# Patient Record
Sex: MALE | Race: WHITE | Employment: OTHER | URBAN - METROPOLITAN AREA
[De-identification: names, ages, dates, MRNs, and addresses within clinical notes are randomized per-mention and may not be internally consistent; named-entity substitution may affect disease eponyms.]

---

## 2017-05-01 ENCOUNTER — OFFICE VISIT (OUTPATIENT)
Dept: NEUROLOGY | Age: 82
End: 2017-05-01

## 2017-05-01 VITALS
HEART RATE: 66 BPM | DIASTOLIC BLOOD PRESSURE: 88 MMHG | WEIGHT: 151 LBS | OXYGEN SATURATION: 96 % | SYSTOLIC BLOOD PRESSURE: 124 MMHG | HEIGHT: 65 IN | RESPIRATION RATE: 15 BRPM | BODY MASS INDEX: 25.16 KG/M2 | TEMPERATURE: 98.1 F

## 2017-05-01 DIAGNOSIS — G30.1 LATE ONSET ALZHEIMER'S DISEASE WITHOUT BEHAVIORAL DISTURBANCE (HCC): Primary | ICD-10-CM

## 2017-05-01 DIAGNOSIS — F02.80 LATE ONSET ALZHEIMER'S DISEASE WITHOUT BEHAVIORAL DISTURBANCE (HCC): Primary | ICD-10-CM

## 2017-05-01 DIAGNOSIS — Z71.89 GOALS OF CARE, COUNSELING/DISCUSSION: ICD-10-CM

## 2017-05-01 RX ORDER — CARVEDILOL 12.5 MG/1
12.5 TABLET ORAL 2 TIMES DAILY
Refills: 2 | Status: ON HOLD | COMMUNITY
Start: 2017-04-19 | End: 2017-07-26

## 2017-05-01 NOTE — PROGRESS NOTES
New patient presenting with h/o falls. Spouse and daughter in law reports increase in falls for past 2 months. Patient suddenly collapses. Was unable to get himself up. Patient current in [address] [place] [address]  and PT.

## 2017-05-01 NOTE — PROGRESS NOTES
5 [facility] [address]   [place]  [facility] [address] [facility], [facility] [address]    GYN   [phone] Fax             Referring: Dr Edgard Aragon      Chief Complaint   Patient presents with   Noah Hallmark Fall     new patient     44-year-old gentleman who presents today accompanied by his wife as well as his daughter-in-law for evaluation of progressive memory decline and also gait dysfunction. I reviewed the office notations that were kindly provided by Dr. Edgard Aragon. He is unable to provide any meaningful history. He will look to his wife and his family for history. As I understand it this gentleman has had a progressive decline in his cognitive function that his wife relates to occurring after his aortic aneurysm repair back in . Over the last 6 months things have taken a more progressive decline. He has been having difficulty with swallowing as well as ambulation. They have physical therapy coming into the home as well as speech therapy. They seen Dr. Edgard Aragon. He has been tried on Aricept which caused him diarrhea. He has stage III-stage IV kidney disease and will be following with the nephrologist.  His mother had dementia and live with them dying at the age of 80. Father  of an aneurysm rupture in the aorta. He has good days and bad days. He repeats questions. He asked the same things forgets conversations and tells the same stories. He has no aggression. He will forget that he has to use his walker and she has to reeducate him about that. Sometimes he is a little obstinate but no aggression. No wandering. He is not driving. His gait has gotten much better with using a walker and with physical therapy. They read devotions every morning and there are mornings where he is unable to read. He does have known carotid stenosis.       Past Medical History:   Diagnosis Date    Falls     Fatigue     Headache     Hearing loss     Heart disease     Hypercholesterolemia     Hypertension     Kidney disease     Memory loss     Muscle weakness     Swallowing difficulty        Past Surgical History:   Procedure Laterality Date    HX AORTIC VALVE REPLACEMENT      HX APPENDECTOMY      HX CORONARY STENT PLACEMENT         Current Outpatient Prescriptions   Medication Sig Dispense Refill    carvedilol (COREG) 12.5 mg tablet 12.5 mg two (2) times a day. 2    simvastatin (ZOCOR) 20 mg tablet Take 1 Tab by mouth nightly. 90 Tab 3    cyanocobalamin 1,000 mcg tablet Take 1 Tab by mouth daily. 30 Tab 11    FLUVIRIN [id number] susp injection ADM 0.5ML IM UTD  0    lisinopril (PRINIVIL, ZESTRIL) 5 mg tablet TAKE 1 TABLET BY MOUTH DAILY 90 Tab 3    cholecalciferol, vitamin D3, 2,000 unit tab Take 1 Tab by mouth daily.  omega-3 fatty acids-vitamin e (FISH OIL) 1,000 mg cap Take 1 Cap by mouth.  b complex vitamins tablet Take 1 Tab by mouth daily. No Known Allergies    Social History   Substance Use Topics    Smoking status: Never Smoker    Smokeless tobacco: Never Used    Alcohol use No       Family History   Problem Relation Age of Onset    Heart Disease Father     Cancer Sister     Dementia Mother     Cancer Sister        Review of Systems  He is unable secondary to dementia. Examination  Visit Vitals    /88    Pulse 66    Temp 98.1 °F (36.7 °C)    Resp 15    Ht 5' 5\" (1.651 m)    Wt 68.5 kg (151 lb)    SpO2 96%    BMI 25.13 kg/m2     He is a pleasant elderly gentleman. He has no icterus. No edema. His heart is regular. I do not hear bruit. He is awake and alert. He has a paucity of spontaneous speech. He looks to his family to answer questions. 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 He retired from being a  she says. He cannot tell me the month the day the date or the year.   He cannot tell me the president even with a hint.  He cannot tell me who ran for president even with hints. He correctly calculates quarters in $1.75. He tells me were on the ground floor where on the second. He has registration 3/3 and recall is 1/3 with hints. He is friendly. He makes jokes. He is fluent. No frontal release signs. Cranial nerves intact 2-12. Disc margins are not well seen. 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 No abnormal movement. No pronation or drift. He resists in all muscle groups in the upper and lower extremities to direct testing bilaterally. Reflexes are symmetrical upper and lowers. No pathologic reflexes. No ataxia. Steady with his Rollator. Impression/Plan  Patient is very pleasant 49-year-old gentleman with dementia likely Alzheimer's. Discussed this today at length with his family. He is been tried on Aricept and we discussed using Namenda which certainly we would have to adjust given his kidney disease. We discussed the book the 24 hour day as a resource for his wife and she does have that written down at home as well where she has been given that advice from the home health people. We discussed overall goals of care. We discussed studies regarding the use of memory modifying medications to stabilize and prolonged decline versus the natural history. We discussed that both the patient and his wife have advanced medical directives covering that he would not want to go on dialysis and he would not want other heroic type measures. We discussed what the potential benefit versus potential risks versus alternatives would be of using Namenda and after the discussion it really was clear that at his stage of decline with his other medical problems and with his predetermined wishes we really would not be offering him anything except for the expense of the medication and potential side effects. His wife is very realistic about his care.   She notes that she has help and they live in a senior facility and she is quite happy caring for him. She has lived through this of course with his mother and we discussed that as well. So at this point he will continue with his home health. We discussed the natural course and issues to be on the look out for. He has an appointment upcoming with the nephrologist and then going back to talk with Dr. Dionne Reeves regarding how to proceed from there. Given everything going on I think his wife is making the appropriate decision and certainly it sounds as though she is making the decision he would want to be made which of course is the important thing. Very nice family and certainly we will remain available as needed. This note was created using voice recognition software. Despite editing, there may be syntax errors. This note will not be viewable in [address]

## 2017-05-01 NOTE — MR AVS SNAPSHOT
Visit Information Date & Time Provider Department Dept. Phone Encounter #  
 5/1/2017 10:00 AM Maday Arteaga MD [facility] [phone] [id number] Follow-up Instructions Return if symptoms worsen or fail to improve. Upcoming Health Maintenance Date Due  
 GLAUCOMA SCREENING Q2Y 3/11/1995 MEDICARE YEARLY EXAM 3/11/1995 Pneumococcal 65+ Low/Medium Risk (2 of 2 - PPSV23) 10/29/2016 INFLUENZA AGE 9 TO ADULT 8/1/2017 DTaP/Tdap/Td series (2 - Td) 10/19/2026 Allergies as of 5/1/2017  Review Complete On: 5/1/2017 By: Carlito Youngblood LPN No Known Allergies Current Immunizations  Never Reviewed Name Date Influenza High Dose Vaccine PF 9/23/2016 Not reviewed this visit Vitals BP Pulse Temp Resp Height(growth percentile) Weight(growth percentile) 124/88 66 98.1 °F (36.7 °C) 15 5' 5\" (1.651 m) 151 lb (68.5 kg) SpO2 BMI Smoking Status 96% 25.13 kg/m2 Never Smoker BMI and BSA Data Body Mass Index Body Surface Area  
 25.13 kg/m 2 1.77 m 2 Preferred Pharmacy Pharmacy Name Phone [facility] [address] [place] [address], [address] [place] [phone] Your Updated Medication List  
  
   
This list is accurate as of: 5/1/17 10:59 AM.  Always use your most recent med list.  
  
  
  
  
 b complex vitamins tablet Take 1 Tab by mouth daily. carvedilol 12.5 mg tablet Commonly known as:  COREG  
12.5 mg two (2) times a day. cholecalciferol (vitamin D3) 2,000 unit Tab Take 1 Tab by mouth daily. cyanocobalamin 1,000 mcg tablet Take 1 Tab by mouth daily. FISH OIL 1,000 mg Cap Generic drug:  omega-3 fatty acids-vitamin e Take 1 Cap by mouth. FLUVIRIN [id number] Susp injection Generic drug:  influenza vaccine 2016-17 (4 yr+) ADM 0.5ML IM UTD  
  
 lisinopril 5 mg tablet Commonly known as:  Lucia Goldman  
 TAKE 1 TABLET BY MOUTH DAILY  
  
 simvastatin 20 mg tablet Commonly known as:  ZOCOR Take 1 Tab by mouth nightly. Follow-up Instructions Return if symptoms worsen or fail to improve. Introducing \[facility]\"" & HEALTH SERVICES! Gonzalo Garcias introduces [employer] patient portal. Now you can access parts of your medical record, email your doctor's office, and request medication refills online. 1. In your internet browser, go to [email or web address] 2. Click on the First Time User? Click Here link in the Sign In box. You will see the New Member Sign Up page. 3. Enter your [employer] Access Code exactly as it appears below. You will not need to use this code after youve completed the sign-up process. If you do not sign up before the expiration date, you must request a new code. · [employer] Access Code: [email or web address] Expires: 7/30/2017 10:58 AM 
 
4. Enter the last four digits of your Social Security Number (xxxx) and Date of Birth (mm/dd/yyyy) as indicated and click Submit. You will be taken to the next sign-up page. 5. Create a [employer] ID. This will be your [employer] login ID and cannot be changed, so think of one that is secure and easy to remember. 6. Create a [employer] password. You can change your password at any time. 7. Enter your Password Reset Question and Answer. This can be used at a later time if you forget your password. 8. Enter your e-mail address. You will receive e-mail notification when new information is available in [address] 9. Click Sign Up. You can now view and download portions of your medical record. 10. Click the Download Summary menu link to download a portable copy of your medical information. If you have questions, please visit the Frequently Asked Questions section of the [employer] website. Remember, [employer] is NOT to be used for urgent needs. For medical emergencies, dial 911. Now available from your iPhone and Android! Please provide this summary of care documentation to your next provider. Your primary care clinician is listed as Amarjit Ortiz. If you have any questions after today's visit, please call [phone].

## 2017-07-16 ENCOUNTER — APPOINTMENT (OUTPATIENT)
Dept: GENERAL RADIOLOGY | Age: 82
End: 2017-07-16
Attending: EMERGENCY MEDICINE
Payer: MEDICARE

## 2017-07-16 ENCOUNTER — HOSPITAL ENCOUNTER (EMERGENCY)
Age: 82
Discharge: HOME | End: 2017-07-16
Attending: EMERGENCY MEDICINE
Payer: MEDICARE

## 2017-07-16 VITALS
TEMPERATURE: 98.5 F | WEIGHT: 165 LBS | RESPIRATION RATE: 14 BRPM | HEIGHT: 65 IN | DIASTOLIC BLOOD PRESSURE: 100 MMHG | OXYGEN SATURATION: 91 % | BODY MASS INDEX: 27.49 KG/M2 | HEART RATE: 90 BPM | SYSTOLIC BLOOD PRESSURE: 174 MMHG

## 2017-07-16 DIAGNOSIS — M25.561 ARTHRALGIA OF RIGHT LOWER LEG: ICD-10-CM

## 2017-07-16 DIAGNOSIS — S80.11XA MULTIPLE LEG CONTUSIONS, RIGHT, INITIAL ENCOUNTER: Primary | ICD-10-CM

## 2017-07-16 PROCEDURE — 73630 X-RAY EXAM OF FOOT: CPT

## 2017-07-16 PROCEDURE — 99285 EMERGENCY DEPT VISIT HI MDM: CPT

## 2017-07-16 PROCEDURE — 73590 X-RAY EXAM OF LOWER LEG: CPT

## 2017-07-16 PROCEDURE — 73552 X-RAY EXAM OF FEMUR 2/>: CPT

## 2017-07-16 PROCEDURE — 74011250637 HC RX REV CODE- 250/637: Performed by: EMERGENCY MEDICINE

## 2017-07-16 RX ORDER — ACETAMINOPHEN 500 MG
1000 TABLET ORAL
Status: COMPLETED | OUTPATIENT
Start: 2017-07-16 | End: 2017-07-16

## 2017-07-16 RX ORDER — TRAMADOL HYDROCHLORIDE 50 MG/1
50 TABLET ORAL
Qty: 15 TAB | Refills: 0 | Status: SHIPPED | OUTPATIENT
Start: 2017-07-16 | End: 2017-07-24

## 2017-07-16 RX ADMIN — ACETAMINOPHEN 1000 MG: 500 TABLET ORAL at 02:55

## 2017-07-16 NOTE — DISCHARGE INSTRUCTIONS
We hope that we have addressed all of your medical concerns. The examination and treatment you received in the Emergency Department were for an emergent problem and were not intended as complete care. It is important that you follow up with your healthcare provider(s) for ongoing care. If your symptoms worsen or do not improve as expected, and you are unable to reach your usual health care provider(s), you should return to the Emergency Department. Today's healthcare is undergoing tremendous change, and patient satisfaction surveys are one of the many tools to assess the quality of medical care. You may receive a survey from the [employer] regarding your experience in the Emergency Department. I hope that your experience has been completely positive, particularly the medical care that I provided. As such, please participate in the survey; anything less than excellent does not meet my expectations or intentions. [facility] and [facility] participate in nationally recognized quality of care measures. If your blood pressure is greater than 120/80, as reported below, we urge that you seek medical care to address the potential of high blood pressure, commonly known as hypertension. Hypertension can be hereditary or can be caused by certain medical conditions, pain, stress, or \"white coat syndrome. \"       Please make an appointment with your health care provider(s) for follow up of your Emergency Department visit. VITALS:   Patient Vitals for the past 8 hrs:   Temp Pulse Resp BP SpO2   07/16/17 0230 - - - (!) 172/105 91 %   07/16/17 0215 - - - (!) 167/102 94 %   07/16/17 0200 - - - (!) 178/110 -   07/16/17 0132 - - - (!) 170/108 -   07/16/17 0130 - - - (!) 170/108 93 %   07/16/17 0124 98.5 °F (36.9 °C) 90 14 (!) 174/112 -          Thank you for allowing us to provide you with medical care today.   We realize that you have many choices for your emergency care needs. Please choose us in the future for any continued health care needs. Neal Lynch [address] [facility]: [phone]            No results found for this or any previous visit (from the past 24 hour(s)). Xr Femur Rt 2 Vs    Result Date: 7/16/2017  EXAM:  XR FEMUR RT 2 VS INDICATION:   fall. COMPARISON: None. FINDINGS: Two views of the right femur on 6 images demonstrate no fracture or other acute osseous, articular or soft tissue abnormality. Mild hip osteoarthrosis is shown. IMPRESSION:  No acute abnormality. Xr Tib/fib Rt    Result Date: 7/16/2017  EXAM:  XR TIB/FIB RT INDICATION:  fall. COMPARISON: None. FINDINGS: AP and lateral  views of the right tibia and fibula demonstrate no fracture or other acute osseous, articular or soft tissue abnormality. IMPRESSION: No acute abnormality. Xr Foot Rt Min 3 V    Result Date: 7/16/2017  EXAM:  XR FOOT RT MIN 3 V INDICATION:   fall. COMPARISON:  None. FINDINGS:  Three views of the right foot demonstrate severe osteoarthrosis of the first metatarsophalangeal joint as well as a bipartite medial bone of the hallux. No acute fracture or dislocation is demonstrated. The bones are mildly osteopenic. Moderately extensive atherosclerotic calcifications are shown. There is mild soft tissue swelling along the dorsum of the foot. IMPRESSION:  No acute abnormality. Contusion: Care Instructions  Your Care Instructions  Contusion is the medical term for a bruise. It is the result of a direct blow or an impact, such as a fall. Contusions are common sports injuries. Most people think of a bruise as a black-and-blue spot. This happens when small blood vessels get torn and leak blood under the skin. But bones, muscles, and organs can also get bruised. This may damage deep tissues but not cause a bruise you can see.   The doctor will do a physical exam to find the location of your contusion. You may also have tests to make sure you do not have a more serious injury, such as a broken bone or nerve damage. These may include X-rays or other imaging tests like a CT scan or MRI. Deep-tissue contusions may cause pain and swelling. But if there is no serious damage, they will often get better in a few weeks with home treatment. The doctor has checked you carefully, but problems can develop later. If you notice any problems or new symptoms, get medical treatment right away. Follow-up care is a key part of your treatment and safety. Be sure to make and go to all appointments, and call your doctor if you are having problems. It's also a good idea to know your test results and keep a list of the medicines you take. How can you care for yourself at home? · Put ice or a cold pack on the sore area for 10 to 20 minutes at a time to stop swelling. Put a thin cloth between the ice pack and your skin. · Be safe with medicines. Read and follow all instructions on the label. ¨ If the doctor gave you a prescription medicine for pain, take it as prescribed. ¨ If you are not taking a prescription pain medicine, ask your doctor if you can take an over-the-counter medicine. · If you can, prop up the sore area on pillows as much as possible for the next few days. Try to keep the sore area above the level of your heart. When should you call for help? Call your doctor now or seek immediate medical care if:  · Your pain gets worse. · You have new or worse swelling. · You have tingling, weakness, or numbness in the area near the contusion. · The area near the contusion is cold or pale. Watch closely for changes in your health, and be sure to contact your doctor if:  · You do not get better as expected. Where can you learn more? Go to http://harsh-ralph.info/. Enter S667 in the search box to learn more about \"Contusion: Care Instructions. \"  Current as of: March 20, 2017  Content Version: 11.3  © 1007-2137 [employer]. Care instructions adapted under license by [employer] (which disclaims liability or warranty for this information). If you have questions about a medical condition or this instruction, always ask your healthcare professional. [address] any warranty or liability for your use of this information. Bruises: Care Instructions  Your Care Instructions    Bruises occur when small blood vessels under the skin tear or rupture, most often from a twist, bump, or fall. Blood leaks into tissues under the skin and causes a black-and-blue spot that often turns colors, including purplish black, reddish blue, or yellowish green, as the bruise heals. Bruises hurt, but most are not serious and will go away on their own within 2 to 4 weeks. Sometimes, gravity causes them to spread down the body. A leg bruise usually will take longer to heal than a bruise on the face or arms. Follow-up care is a key part of your treatment and safety. Be sure to make and go to all appointments, and call your doctor if you are having problems. Its also a good idea to know your test results and keep a list of the medicines you take. How can you care for yourself at home? · Take pain medicines exactly as directed. ¨ If the doctor gave you a prescription medicine for pain, take it as prescribed. ¨ If you are not taking a prescription pain medicine, ask your doctor if you can take an over-the-counter medicine. · Put ice or a cold pack on the area for 10 to 20 minutes at a time. Put a thin cloth between the ice and your skin. · If you can, prop up the bruised area on pillows as much as possible for the next few days. Try to keep the bruise above the level of your heart. When should you call for help?   Call your doctor now or seek immediate medical care if:  · You have signs of infection, such as:  ¨ Increased pain, swelling, warmth, or redness. ¨ Red streaks leading from the bruise. ¨ Pus draining from the bruise. ¨ A fever. · You have a bruise on your leg and signs of a blood clot, such as:  ¨ Increasing redness and swelling along with warmth, tenderness, and pain in the bruised area. ¨ Pain in your calf, back of the knee, thigh, or groin. ¨ Redness and swelling in your leg or groin. · Your pain gets worse. Watch closely for changes in your health, and be sure to contact your doctor if:  · You do not get better as expected. Where can you learn more? Go to http://harsh-ralph.info/. Enter ([phone] 889-772 in the search box to learn more about \"Bruises: Care Instructions. \"  Current as of: March 20, 2017  Content Version: 11.3  © 1834-8961 [email or web address]. Care instructions adapted under license by [employer] (which disclaims liability or warranty for this information). If you have questions about a medical condition or this instruction, always ask your healthcare professional. [place] [address] any warranty or liability for your use of this information.

## 2017-07-16 NOTE — ED PROVIDER NOTES
HPI Comments: 80 y.o. male with past medical history significant for dementia who presents from nursing home via EMS with chief complaint of right leg pain and swelling. Per EMS the patient fell last week and injured his right leg during the incident. Since the incident the patient has continued to have right leg swelling which concerned the facility and he was sent to the ED for further evaluation. Pt is unsure of why he was sent to the ED second to his dementia. There are no other acute medical concerns at this time. Full history, physical exam, and ROS unable to be obtained due to:  dementia. PCP: Gely Lynn MD  Note written by kane Butler, as dictated by Emily Savage, DO 1:31 AM        The history is provided by the patient. Past Medical History:   Diagnosis Date    Falls     Fatigue     Headache     Hearing loss     Heart disease     Hypercholesterolemia     Hypertension     Kidney disease     Memory loss     Muscle weakness     Swallowing difficulty        Past Surgical History:   Procedure Laterality Date    HX AORTIC VALVE REPLACEMENT      HX APPENDECTOMY      HX CORONARY STENT PLACEMENT           Family History:   Problem Relation Age of Onset    Heart Disease Father     Cancer Sister     Dementia Mother     Cancer Sister        Social History     Social History    Marital status:      Spouse name: N/A    Number of children: N/A    Years of education: N/A     Occupational History    Not on file. Social History Main Topics    Smoking status: Never Smoker    Smokeless tobacco: Never Used    Alcohol use No    Drug use: No    Sexual activity: No     Other Topics Concern    Not on file     Social History Narrative     ALLERGIES: Review of patient's allergies indicates no known allergies.     Review of Systems   Unable to perform ROS: Dementia       Vitals:    07/16/17 0124 07/16/17 0130   BP: (!) 174/112    Pulse: 90    Resp: 14 Temp: 98.5 °F (36.9 °C)    SpO2:  96%   Weight: 74.8 kg (165 lb)    Height: 5' 5\" (1.651 m)             Physical Exam   Constitutional: He appears well-developed and well-nourished. No distress. HENT:   Head: Normocephalic and atraumatic. Right Ear: External ear normal.   Left Ear: External ear normal.   Nose: Nose normal.   Mouth/Throat: Oropharynx is clear and moist. No oropharyngeal exudate. Eyes: Conjunctivae and EOM are normal. Pupils are equal, round, and reactive to light. Right eye exhibits no discharge. Left eye exhibits no discharge. No scleral icterus. Neck: Normal range of motion. Neck supple. No JVD present. No tracheal deviation present. Cardiovascular: Normal rate, regular rhythm, normal heart sounds and intact distal pulses. Exam reveals no gallop and no friction rub. No murmur heard. Pulmonary/Chest: Effort normal and breath sounds normal. No stridor. No respiratory distress. He has no decreased breath sounds. He has no wheezes. He has no rhonchi. He has no rales. He exhibits no tenderness. Abdominal: Soft. Bowel sounds are normal. He exhibits no distension. There is no tenderness. There is no rebound and no guarding. Musculoskeletal: Normal range of motion. He exhibits tenderness. He exhibits no edema. Left upper leg: He exhibits tenderness. Legs:  Area of ecchymosis to the medial distal right thigh and the lateral proximal right calf. Mild swelling to the distal right thigh, calf, and right foot, however all compartments are soft. Distal pulses are 2+ in all extremities. Neurological: He is alert. He has normal strength and normal reflexes. No cranial nerve deficit or sensory deficit. He exhibits normal muscle tone. Coordination normal. GCS eye subscore is 4. GCS verbal subscore is 5. GCS motor subscore is 6. Skin: Skin is warm and dry. No rash noted. He is not diaphoretic. No erythema. No pallor. Psychiatric: He has a normal mood and affect.  His behavior is normal.   Nursing note and vitals reviewed. Note written by kane Bansal, as dictated by Alfonso Calero DO 1:39 AM    MDM  Number of Diagnoses or Management Options  Arthralgia of right lower leg:   Multiple leg contusions, right, initial encounter:      Amount and/or Complexity of Data Reviewed  Tests in the radiology section of CPT®: ordered and reviewed    Risk of Complications, Morbidity, and/or Mortality  Presenting problems: moderate  Diagnostic procedures: low  Management options: low    Patient Progress  Patient progress: stable    ED Course       Procedures   Chief Complaint   Patient presents with    Leg Pain       5:53 AM  The patients presenting problems have been discussed, and they are in agreement with the care plan formulated and outlined with them. I have encouraged them to ask questions as they arise throughout their visit. MEDICATIONS GIVEN:  Medications   acetaminophen (TYLENOL) tablet 1,000 mg (1,000 mg Oral Given 7/16/17 0255)       LABS REVIEWED:  No results found for this or any previous visit (from the past 24 hour(s)). VITAL SIGNS:  Patient Vitals for the past 12 hrs:   Temp Pulse Resp BP SpO2   07/16/17 0245 - - - (!) 174/100 91 %   07/16/17 0230 - - - (!) 172/105 91 %   07/16/17 0215 - - - (!) 167/102 94 %   07/16/17 0200 - - - (!) 178/110 -   07/16/17 0132 - - - (!) 170/108 -   07/16/17 0130 - - - (!) 170/108 93 %   07/16/17 0124 98.5 °F (36.9 °C) 90 14 (!) 174/112 -       RADIOLOGY RESULTS:  The following have been ordered and reviewed:  XR FOOT RT MIN 3 V   Final Result      XR FEMUR RT 2 VS   Final Result      XR TIB/FIB RT   Final Result        Study Result      EXAM:  XR FEMUR RT 2 VS     INDICATION:   fall.     COMPARISON: None.     FINDINGS: Two views of the right femur on 6 images demonstrate no fracture or  other acute osseous, articular or soft tissue abnormality.  Mild hip  osteoarthrosis is shown.       IMPRESSION  IMPRESSION:  No acute abnormality. Study Result      EXAM:  XR TIB/FIB RT     INDICATION:  fall.     COMPARISON: None.     FINDINGS: AP and lateral  views of the right tibia and fibula demonstrate no  fracture or other acute osseous, articular or soft tissue abnormality.     IMPRESSION  IMPRESSION: No acute abnormality. Study Result      EXAM:  XR FOOT RT MIN 3 V     INDICATION:   fall.     COMPARISON:  None.     FINDINGS:  Three views of the right foot demonstrate severe osteoarthrosis of  the first metatarsophalangeal joint as well as a bipartite medial bone of the  hallux. No acute fracture or dislocation is demonstrated. The bones are mildly  osteopenic. Moderately extensive atherosclerotic calcifications are shown. There is mild soft tissue swelling along the dorsum of the foot.     IMPRESSION  IMPRESSION:  No acute abnormality. PROGRESS NOTES:  Discussed results and plan with patient and family. Patient will be discharged home with PCP followup. Patient instructed to return to the emergency room for any worsening symptoms or any other concerns. DIAGNOSIS:    1. Multiple leg contusions, right, initial encounter    2. Arthralgia of right lower leg        PLAN:  Follow-up Information     Follow up With Details Comments Contact Info    Peterson Clinton MD In 3 days  [address] [facility] [address]  [phone]      [facility] EMERGENCY DEPT  If symptoms worsen [address] [facility]  [phone]        Discharge Medication List as of 7/16/2017  2:49 AM      START taking these medications    Details   traMADol (ULTRAM) 50 mg tablet Take 1 Tab by mouth every six (6) hours as needed for Pain.  Max Daily Amount: 200 mg., Print, Disp-15 Tab, R-0         CONTINUE these medications which have NOT CHANGED    Details   carvedilol (COREG) 12.5 mg tablet 12.5 mg two (2) times a day., Historical Med, R-2      simvastatin (ZOCOR) 20 mg tablet Take 1 Tab by mouth nightly., Normal, Disp-90 Tab, R-3 omega-3 fatty acids-vitamin e (FISH OIL) 1,000 mg cap Take 1 Cap by mouth., Historical Med               ED COURSE: The patients hospital course has been uncomplicated.

## 2017-07-24 ENCOUNTER — APPOINTMENT (OUTPATIENT)
Dept: GENERAL RADIOLOGY | Age: 82
DRG: 291 | End: 2017-07-24
Attending: EMERGENCY MEDICINE
Payer: MEDICARE

## 2017-07-24 ENCOUNTER — HOSPITAL ENCOUNTER (INPATIENT)
Age: 82
LOS: 2 days | Discharge: HOME HEALTH | DRG: 291 | End: 2017-07-26
Attending: EMERGENCY MEDICINE | Admitting: INTERNAL MEDICINE
Payer: MEDICARE

## 2017-07-24 DIAGNOSIS — N17.9 ACUTE RENAL FAILURE, UNSPECIFIED ACUTE RENAL FAILURE TYPE (HCC): ICD-10-CM

## 2017-07-24 DIAGNOSIS — R06.02 SOB (SHORTNESS OF BREATH): Primary | ICD-10-CM

## 2017-07-24 PROBLEM — R06.00 DYSPNEA: Status: ACTIVE | Noted: 2017-07-24

## 2017-07-24 PROBLEM — I50.9 CHF, ACUTE (HCC): Status: ACTIVE | Noted: 2017-07-24

## 2017-07-24 PROBLEM — E87.1 HYPONATREMIA: Status: ACTIVE | Noted: 2017-07-24

## 2017-07-24 PROBLEM — D64.9 ANEMIA: Status: ACTIVE | Noted: 2017-07-24

## 2017-07-24 LAB
ALBUMIN SERPL BCP-MCNC: 3 G/DL (ref 3.5–5)
ALBUMIN/GLOB SERPL: 0.8 {RATIO} (ref 1.1–2.2)
ALP SERPL-CCNC: 65 U/L (ref 45–117)
ALT SERPL-CCNC: 28 U/L (ref 12–78)
ANION GAP BLD CALC-SCNC: 11 MMOL/L (ref 5–15)
AST SERPL W P-5'-P-CCNC: 20 U/L (ref 15–37)
ATRIAL RATE: 83 BPM
BASOPHILS # BLD AUTO: 0 K/UL (ref 0–0.1)
BASOPHILS # BLD: 0 % (ref 0–1)
BILIRUB SERPL-MCNC: 1 MG/DL (ref 0.2–1)
BNP SERPL-MCNC: ABNORMAL PG/ML (ref 0–450)
BUN SERPL-MCNC: 30 MG/DL (ref 6–20)
BUN/CREAT SERPL: 13 (ref 12–20)
CALCIUM SERPL-MCNC: 8 MG/DL (ref 8.5–10.1)
CALCULATED P AXIS, ECG09: 5 DEGREES
CALCULATED R AXIS, ECG10: -17 DEGREES
CALCULATED T AXIS, ECG11: 137 DEGREES
CHLORIDE SERPL-SCNC: 99 MMOL/L (ref 97–108)
CHOLEST SERPL-MCNC: 116 MG/DL
CO2 SERPL-SCNC: 23 MMOL/L (ref 21–32)
CREAT SERPL-MCNC: 2.28 MG/DL (ref 0.7–1.3)
D DIMER PPP FEU-MCNC: 14.69 MG/L FEU (ref 0–0.65)
DIAGNOSIS, 93000: NORMAL
EOSINOPHIL # BLD: 0.2 K/UL (ref 0–0.4)
EOSINOPHIL NFR BLD: 2 % (ref 0–7)
ERYTHROCYTE [DISTWIDTH] IN BLOOD BY AUTOMATED COUNT: 15.7 % (ref 11.5–14.5)
FERRITIN SERPL-MCNC: 249 NG/ML (ref 26–388)
FOLATE SERPL-MCNC: 13 NG/ML (ref 5–21)
GLOBULIN SER CALC-MCNC: 3.8 G/DL (ref 2–4)
GLUCOSE SERPL-MCNC: 108 MG/DL (ref 65–100)
HAPTOGLOB SERPL-MCNC: 116 MG/DL (ref 30–200)
HCT VFR BLD AUTO: 26 % (ref 36.6–50.3)
HDLC SERPL-MCNC: 54 MG/DL
HDLC SERPL: 2.1 {RATIO} (ref 0–5)
HGB BLD-MCNC: 8.7 G/DL (ref 12.1–17)
IRON SATN MFR SERPL: 27 % (ref 20–50)
IRON SERPL-MCNC: 67 UG/DL (ref 35–150)
LDH SERPL L TO P-CCNC: 314 U/L (ref 85–241)
LDLC SERPL CALC-MCNC: 41.2 MG/DL (ref 0–100)
LIPID PROFILE,FLP: NORMAL
LYMPHOCYTES # BLD AUTO: 16 % (ref 12–49)
LYMPHOCYTES # BLD: 1.1 K/UL (ref 0.8–3.5)
MCH RBC QN AUTO: 29.9 PG (ref 26–34)
MCHC RBC AUTO-ENTMCNC: 33.5 G/DL (ref 30–36.5)
MCV RBC AUTO: 89.3 FL (ref 80–99)
MONOCYTES # BLD: 0.6 K/UL (ref 0–1)
MONOCYTES NFR BLD AUTO: 8 % (ref 5–13)
NEUTS SEG # BLD: 5.2 K/UL (ref 1.8–8)
NEUTS SEG NFR BLD AUTO: 74 % (ref 32–75)
P-R INTERVAL, ECG05: 296 MS
PLATELET # BLD AUTO: 122 K/UL (ref 150–400)
POTASSIUM SERPL-SCNC: 3.9 MMOL/L (ref 3.5–5.1)
PROT SERPL-MCNC: 6.8 G/DL (ref 6.4–8.2)
Q-T INTERVAL, ECG07: 396 MS
QRS DURATION, ECG06: 88 MS
QTC CALCULATION (BEZET), ECG08: 465 MS
RBC # BLD AUTO: 2.91 M/UL (ref 4.1–5.7)
RETICS/RBC NFR AUTO: 3.7 % (ref 0.7–2.1)
SODIUM SERPL-SCNC: 133 MMOL/L (ref 136–145)
TIBC SERPL-MCNC: 252 UG/DL (ref 250–450)
TRIGL SERPL-MCNC: 104 MG/DL (ref ?–150)
TROPONIN I SERPL-MCNC: 0.06 NG/ML
VENTRICULAR RATE, ECG03: 83 BPM
VIT B12 SERPL-MCNC: 752 PG/ML (ref 211–911)
VLDLC SERPL CALC-MCNC: 20.8 MG/DL
WBC # BLD AUTO: 7 K/UL (ref 4.1–11.1)

## 2017-07-24 PROCEDURE — 80061 LIPID PANEL: CPT | Performed by: INTERNAL MEDICINE

## 2017-07-24 PROCEDURE — 84484 ASSAY OF TROPONIN QUANT: CPT | Performed by: EMERGENCY MEDICINE

## 2017-07-24 PROCEDURE — 82728 ASSAY OF FERRITIN: CPT | Performed by: INTERNAL MEDICINE

## 2017-07-24 PROCEDURE — 74011250637 HC RX REV CODE- 250/637: Performed by: SPECIALIST

## 2017-07-24 PROCEDURE — 74011250636 HC RX REV CODE- 250/636: Performed by: INTERNAL MEDICINE

## 2017-07-24 PROCEDURE — 83615 LACTATE (LD) (LDH) ENZYME: CPT | Performed by: INTERNAL MEDICINE

## 2017-07-24 PROCEDURE — 36415 COLL VENOUS BLD VENIPUNCTURE: CPT | Performed by: EMERGENCY MEDICINE

## 2017-07-24 PROCEDURE — 85379 FIBRIN DEGRADATION QUANT: CPT | Performed by: EMERGENCY MEDICINE

## 2017-07-24 PROCEDURE — 93005 ELECTROCARDIOGRAM TRACING: CPT

## 2017-07-24 PROCEDURE — 85025 COMPLETE CBC W/AUTO DIFF WBC: CPT | Performed by: EMERGENCY MEDICINE

## 2017-07-24 PROCEDURE — 96374 THER/PROPH/DIAG INJ IV PUSH: CPT

## 2017-07-24 PROCEDURE — 74011250637 HC RX REV CODE- 250/637: Performed by: INTERNAL MEDICINE

## 2017-07-24 PROCEDURE — 71020 XR CHEST PA LAT: CPT

## 2017-07-24 PROCEDURE — 65660000000 HC RM CCU STEPDOWN

## 2017-07-24 PROCEDURE — 93306 TTE W/DOPPLER COMPLETE: CPT

## 2017-07-24 PROCEDURE — 82607 VITAMIN B-12: CPT | Performed by: INTERNAL MEDICINE

## 2017-07-24 PROCEDURE — 74011250636 HC RX REV CODE- 250/636: Performed by: SPECIALIST

## 2017-07-24 PROCEDURE — 83880 ASSAY OF NATRIURETIC PEPTIDE: CPT | Performed by: INTERNAL MEDICINE

## 2017-07-24 PROCEDURE — 80053 COMPREHEN METABOLIC PANEL: CPT | Performed by: EMERGENCY MEDICINE

## 2017-07-24 PROCEDURE — 83010 ASSAY OF HAPTOGLOBIN QUANT: CPT | Performed by: INTERNAL MEDICINE

## 2017-07-24 PROCEDURE — 85045 AUTOMATED RETICULOCYTE COUNT: CPT | Performed by: INTERNAL MEDICINE

## 2017-07-24 PROCEDURE — 83540 ASSAY OF IRON: CPT | Performed by: INTERNAL MEDICINE

## 2017-07-24 PROCEDURE — 99285 EMERGENCY DEPT VISIT HI MDM: CPT

## 2017-07-24 PROCEDURE — 93971 EXTREMITY STUDY: CPT

## 2017-07-24 PROCEDURE — 82746 ASSAY OF FOLIC ACID SERUM: CPT | Performed by: INTERNAL MEDICINE

## 2017-07-24 RX ORDER — NALOXONE HYDROCHLORIDE 0.4 MG/ML
0.4 INJECTION, SOLUTION INTRAMUSCULAR; INTRAVENOUS; SUBCUTANEOUS AS NEEDED
Status: DISCONTINUED | OUTPATIENT
Start: 2017-07-24 | End: 2017-07-26 | Stop reason: HOSPADM

## 2017-07-24 RX ORDER — AMLODIPINE BESYLATE 5 MG/1
5 TABLET ORAL DAILY
Status: DISCONTINUED | OUTPATIENT
Start: 2017-07-24 | End: 2017-07-26 | Stop reason: HOSPADM

## 2017-07-24 RX ORDER — FUROSEMIDE 10 MG/ML
20 INJECTION INTRAMUSCULAR; INTRAVENOUS ONCE
Status: COMPLETED | OUTPATIENT
Start: 2017-07-24 | End: 2017-07-24

## 2017-07-24 RX ORDER — FUROSEMIDE 20 MG/1
20 TABLET ORAL DAILY
Status: DISCONTINUED | OUTPATIENT
Start: 2017-07-25 | End: 2017-07-26 | Stop reason: HOSPADM

## 2017-07-24 RX ORDER — ONDANSETRON 2 MG/ML
4 INJECTION INTRAMUSCULAR; INTRAVENOUS
Status: DISCONTINUED | OUTPATIENT
Start: 2017-07-24 | End: 2017-07-26 | Stop reason: HOSPADM

## 2017-07-24 RX ORDER — FUROSEMIDE 10 MG/ML
40 INJECTION INTRAMUSCULAR; INTRAVENOUS ONCE
Status: COMPLETED | OUTPATIENT
Start: 2017-07-24 | End: 2017-07-24

## 2017-07-24 RX ORDER — CARVEDILOL 6.25 MG/1
12.5 TABLET ORAL 2 TIMES DAILY
Status: DISCONTINUED | OUTPATIENT
Start: 2017-07-24 | End: 2017-07-26 | Stop reason: HOSPADM

## 2017-07-24 RX ORDER — FUROSEMIDE 10 MG/ML
40 INJECTION INTRAMUSCULAR; INTRAVENOUS ONCE
Status: DISCONTINUED | OUTPATIENT
Start: 2017-07-24 | End: 2017-07-24

## 2017-07-24 RX ORDER — SODIUM CHLORIDE 0.9 % (FLUSH) 0.9 %
5-10 SYRINGE (ML) INJECTION EVERY 8 HOURS
Status: DISCONTINUED | OUTPATIENT
Start: 2017-07-24 | End: 2017-07-26 | Stop reason: HOSPADM

## 2017-07-24 RX ORDER — SODIUM CHLORIDE 0.9 % (FLUSH) 0.9 %
5-10 SYRINGE (ML) INJECTION AS NEEDED
Status: DISCONTINUED | OUTPATIENT
Start: 2017-07-24 | End: 2017-07-26 | Stop reason: HOSPADM

## 2017-07-24 RX ORDER — HEPARIN SODIUM 5000 [USP'U]/ML
5000 INJECTION, SOLUTION INTRAVENOUS; SUBCUTANEOUS EVERY 8 HOURS
Status: DISCONTINUED | OUTPATIENT
Start: 2017-07-24 | End: 2017-07-26 | Stop reason: HOSPADM

## 2017-07-24 RX ORDER — ACETAMINOPHEN 325 MG/1
650 TABLET ORAL
Status: DISCONTINUED | OUTPATIENT
Start: 2017-07-24 | End: 2017-07-26 | Stop reason: HOSPADM

## 2017-07-24 RX ORDER — DIPHENHYDRAMINE HCL 25 MG
25 CAPSULE ORAL
Status: DISCONTINUED | OUTPATIENT
Start: 2017-07-24 | End: 2017-07-26 | Stop reason: HOSPADM

## 2017-07-24 RX ORDER — SIMVASTATIN 20 MG/1
20 TABLET, FILM COATED ORAL
Status: DISCONTINUED | OUTPATIENT
Start: 2017-07-24 | End: 2017-07-26 | Stop reason: HOSPADM

## 2017-07-24 RX ADMIN — AMLODIPINE BESYLATE 5 MG: 5 TABLET ORAL at 15:49

## 2017-07-24 RX ADMIN — HEPARIN SODIUM 5000 UNITS: 5000 INJECTION, SOLUTION INTRAVENOUS; SUBCUTANEOUS at 15:50

## 2017-07-24 RX ADMIN — FUROSEMIDE 20 MG: 10 INJECTION, SOLUTION INTRAMUSCULAR; INTRAVENOUS at 12:22

## 2017-07-24 RX ADMIN — CARVEDILOL 12.5 MG: 6.25 TABLET, FILM COATED ORAL at 17:14

## 2017-07-24 RX ADMIN — Medication 10 ML: at 22:30

## 2017-07-24 RX ADMIN — Medication 10 ML: at 15:50

## 2017-07-24 RX ADMIN — SIMVASTATIN 20 MG: 20 TABLET, FILM COATED ORAL at 22:35

## 2017-07-24 RX ADMIN — FUROSEMIDE 40 MG: 10 INJECTION, SOLUTION INTRAMUSCULAR; INTRAVENOUS at 15:51

## 2017-07-24 NOTE — PROCEDURES
Megan Jay  *** FINAL REPORT ***    Name: Alba Starkey  MRN: [id number]    Outpatient  : 11 Mar 1930  HIS Order #: [id number]  [id number] [facility] Visit #: [id number]  Date: 2017    TYPE OF TEST: Peripheral Venous Testing    REASON FOR TEST  Pain in limb, Limb swelling    Right Leg:-  Deep venous thrombosis:           No  Superficial venous thrombosis:    No  Deep venous insufficiency:        No  Superficial venous insufficiency: No      INTERPRETATION/FINDINGS  PROCEDURE:  RIGHT LOWER EXTREMITY  VENOUS DUPLEX. Evaluation of lower  extremity veins with ultrasound (B-mode imaging, pulsed Doppler, color   Doppler). Includes the common femoral, deep femoral, femoral,  popliteal, posterior tibial, peroneal, and great saphenous veins. Other veins, for example the gastrocnemius and soleal veins, may also  be visualized. FINDINGS: Prasanth Raoul scale and color flow duplex images of the veins in the  right lower extremity demonstrate normal compressibility, spontaneous  and augmented flow profiles, and absence of filling defects throughout   the deep and superficial veins in the right lower extremity. CONCLUSION: Right lower extremity venous duplex negative for deep  venous thrombosis or thrombophlebitis. There is an incidental finding  of a prominent right groin lymph node measuring 2.70 x 0.92 cm. Avascular structure noted in the right popliteal fossa measuring 5.2 x   2.0 cm, possibly Baker's cyst vs a hematoma. Left common femoral vein   is thrombus free. ADDITIONAL COMMENTS    I have personally reviewed the data relevant to the interpretation of  this  study. TECHNOLOGIST: Jarrell Chaney RDCS  Signed: 2017 10:45 AM    PHYSICIAN: Alexis Del Cid MD  Signed: 2017 02:19 PM

## 2017-07-24 NOTE — CARDIO/PULMONARY
Cardiac Wellness: 7/24/2017 @ 1545:  Received cardiac rehab consult for CHF education. Heart Failure education folder given to [facility]/family. Cardiac medications:    ACE/ARB:  BB: carvedilol   Statin: simvastatin   ASA:  Prior to admission cardiac meds include:  Omega-3 fish oil. Educated using teach back method. Discussed diagnosis definition and assessed patient/family understanding. Pt has h/o dementia and is not aware of why he is in the hospital.  Wife and dgt are present in room and wife is POA. Wife reported she was told today about CHF but not clear why this means. Explained systolic verses diastolic HF. Wife reported pt has echo in ED results have not been explained to family as yet. Reviewed importance of daily weight monitoring and low sodium diet (less than 1500 mg. daily). Wife has a scale however she has been more concerned with pt's weight lose and was not aware of fluid volume issues. Wife reported she does not cook with salt however pt does use salt shaker. Encouraged to get rid of salt shaker. Reinforced importance of low Na diet. Encouraged activity and rest periods within symptom limitations and as ordered by physician. Reviewed new meds - amlodipine, Lasix -  purpose of medication, potential side effects and what to do if dose is missed. Discussed importance of reporting signs and symptoms of exacerbation and when to report them to the doctor to prevent re-hospitalization. [facility]/family was encouraged to keep all appointments with doctor. Discussed ability to obtain prescription meds and encouraged conversations with physician if unable to do so. Smoking history assessed. Pt is a never smoker. HF teach back questions answered by family. Patient did not participate in education session. 7/25/2017 @ 1345:  Attempt to met with pt and family to review CHF information. Pt is pleasantly confused. Sitter is present in room. No family at this time.   Will follow.

## 2017-07-24 NOTE — PROGRESS NOTES
Primary Nurse Scott Barkley RN and REX Peña performed a dual skin assessment on this patient No impairment noted  Alonzo score is 16

## 2017-07-24 NOTE — ED TRIAGE NOTES
Continued leg pain from a GLF last week. Was seen initially. States the pain is \"nothing serious\" and is \"better now. \"  Ambulatory.

## 2017-07-24 NOTE — CONSULTS
Ryan Martínez MD   [facility] [address]    Office [phone]  Mobile [phone]   Cardiology Consultation:    Kelsey Jaramillo is a 80 y.o. male admitted on 7/24/2017  9:14 AM for Dyspnea. I am asked by the hospitalist to evaluate for CHF      IMPRESSION:   1: (Probable subacute on chronic) CHF, POA, unclear whether systolic vs diastolic       Lung congestion, SOB  2: Leg edema, R>L, probably partly CHF, but asymmetry suggests presence of other ?venous insufficiency  3: Ischemic Heart disease, ?details: EKG suggests possible old IMI, and he had coronary stent in 1990; subacute CHF   4: HBP  5: CRF, dementia, DDNR (per wife, POA). 6: Dysphagia  7: Recurrent falls, unclear whether any LOC or other definable/reversible cause. 8: Hx abdominal aortic aneurysm repair (NOT aortic valve surgery)  9: Anemia (recent), thrombocytopenia (old)                              RECOMMENDATIONS / PLAN:   Explained pathophysiology of CHF with patient and wife. Echo in progress, though unlikely to alter Rx. Rx mainly with diuretics, unlikely we will use ACEI/ARB if EF low, given renal failure  For now, norvasc for HBP  Wife reports he has durable DNR, will implement DNR for this admission     History of present illness:  Remote coronary stent, unclear why, no apparent heart problems since. Recent SOB, with orthopnea past 1-2 nights. No chest pain.     Past Medical History:   Diagnosis Date    CAD (coronary artery disease), native coronary artery     CKD (chronic kidney disease) stage 3, GFR 30-59 ml/min     Debilitated patient     Dementia     Dysphagia     Hearing loss     Hypercholesterolemia     Hypertension       Past Surgical History:   Procedure Laterality Date    HX AORTIC VALVE REPLACEMENT      HX APPENDECTOMY      HX CORONARY STENT PLACEMENT       Family History   Problem Relation Age of Onset    Heart Disease Father     Cancer Sister     Dementia Mother     Cancer Sister       Social History   Substance Use Topics    Smoking status: Never Smoker    Smokeless tobacco: Never Used    Alcohol use No       Primary care physician:  Jose Luis Victor MD     Medications before admission:  Reviewed. Review of systems:  Recurrent falls April 2017 leading no Neuro eval --> dementia. Again fell two weeks ago, since then bruising and right leg swelling. Gets around with walker. All other systems reviewed and are negative except as above.     Physical Exam:  Visit Vitals    BP (!) 181/118    Pulse 84    Temp 97.4 °F (36.3 °C)    Resp 18    Ht 5' 8\" (1.727 m)    Wt 165 lb (74.8 kg)    SpO2 94%    BMI 25.09 kg/m2        Appearance: frail appearance    Cardiovascular: normal pulses and heart sounds without murmurs    Lungs: rales both bases 1/3 up    Abdomen: nontender    Extremities: mild edema R>L       Laboratory and Imaging have been personally reviewed and are notable for:    EKG: NSR, old IMI    X Ray: CHF    Labs:    Recent Labs      07/24/17   1008   NA  133*   K  3.9   CL  99   BUN  30*   CREA  2.28*   GLU  108*   CA  8.0*     Recent Labs      07/24/17   1008   WBC  7.0   HGB  8.7*   HCT  26.0*   PLT  122*

## 2017-07-24 NOTE — PROGRESS NOTES
[facility]: MED RECONCILIATION    Comments/Recommendations:     Medication(s) ADDED to PTA list:  1. none    Medication(s) REMOVED from PTA list:  1. Tramadol 50 mg prn    Medication(s) ADJUSTED on PTA list:  1. Frequency of \"daily\" added to Fish Oil. Information obtained from: Spouse/ Rx Query    Significant PMH/Disease States:   Past Medical History:   Diagnosis Date    CAD (coronary artery disease), native coronary artery     CKD (chronic kidney disease) stage 3, GFR 30-59 ml/min     Debilitated patient     Dementia     Dysphagia     Hearing loss     Hypercholesterolemia     Hypertension        Chief Complaint for this Admission:   Chief Complaint   Patient presents with    Leg Pain       Allergies: Review of patient's allergies indicates no known allergies. Prior to Admission Medications:     Medication Documentation Review Audit       Reviewed by Marc Flowers PHARMD (Pharmacist) on 07/24/17 at 1214         Medication Sig Documenting Provider Last Dose Status Taking?      carvedilol (COREG) 12.5 mg tablet 12.5 mg two (2) times a day. Historical Provider 7/23/2017 1900 Active Yes             Med Note (Ladonna Hollins   Mon Jul 24, 2017 12:13 PM):        omega-3 fatty acids-vitamin e (FISH OIL) 1,000 mg cap Take 1 Cap by mouth daily. Historical Provider 7/23/2017 am Active Yes    simvastatin (ZOCOR) 20 mg tablet Take 1 Tab by mouth nightly.  Willian Wick MD 7/23/2017 1900 Active Yes                        Marc Flowers [place]   Contact: [phone]

## 2017-07-24 NOTE — PROGRESS NOTES
Bedside and Verbal shift change report given to Kelvin Goodell (oncoming nurse) by Edenilson Arellano (offgoing nurse). Report included the following information SBAR, Kardex, MAR, Accordion and Recent Results.

## 2017-07-24 NOTE — PROGRESS NOTES
7/24/2017  CARE MANAGEMENT NOTE:  CM is following pt in the ER for initial discharge planning. EMR reviewed. CM met with pt and his wife ([phone]) at bedside to obtain hx for this needs assessment. Reportedly, pt resides with his wife in a 4th floor apt at [facility], a senior [facility] with elevator access. PTA, pt was ambulatory with a rolling walker, and he requires assistance with ADLs. Pt uses [email or web address] on [address]. He does not have home healthcare currently. DME in the home includes a rolling walker. PCP is Dr. January Meza. Plan is to return home when medically stable. Pt is currently admitted under OBS status. CM provided written and oral explanation of Medicare Outpt OBS letter and State OBS letter. Signed copies will be scanned into pt's chart.   Fredy

## 2017-07-24 NOTE — PROGRESS NOTES
TRANSFER - OUT REPORT:    Verbal report given to Marval Saint (name) on Ashlyn Nolasco  being transferred to [phone] (unit) for routine progression of care       Report consisted of patients Situation, Background, Assessment and   Recommendations(SBAR). Information from the following report(s) SBAR, Kardex, ED Summary, [facility] and Recent Results was reviewed with the receiving nurse. Lines:   Peripheral IV 07/24/17 Right Forearm (Active)   Site Assessment Clean, dry, & intact 7/24/2017 10:10 AM   Phlebitis Assessment 0 7/24/2017 10:10 AM   Infiltration Assessment 0 7/24/2017 10:10 AM   Dressing Status Clean, dry, & intact 7/24/2017 10:10 AM   Dressing Type Transparent;Tape 7/24/2017 10:10 AM   Hub Color/Line Status Pink;Flushed;Patent 7/24/2017 10:10 AM   Action Taken Blood drawn 7/24/2017 10:10 AM       Peripheral IV 07/24/17 Left Antecubital (Active)   Site Assessment Clean, dry, & intact 7/24/2017 10:46 AM   Phlebitis Assessment 0 7/24/2017 10:46 AM   Infiltration Assessment 0 7/24/2017 10:46 AM   Dressing Status Clean, dry, & intact; New 7/24/2017 10:46 AM   Dressing Type Transparent 7/24/2017 10:46 AM   Hub Color/Line Status Pink;Flushed;Patent 7/24/2017 10:46 AM        Opportunity for questions and clarification was provided.       Patient transported with:   [employer]

## 2017-07-24 NOTE — ED PROVIDER NOTES
Patient is a 80 y.o. male presenting with leg pain. Leg Pain           80y M with hx of HTN here with shortness of breath. 2 weeks ago he had a fall and landed mostly on his RLE. Had swelling and bruising afterwards. Was evaluated at the ED for this without any acute findings. Had follow-up with his PMD. Continues to have pain and swelling in the RLE but now is also having difficulty breathing. Seems to come and go but worse at night. Also with some vomiting and epigastric discomfort. No fever. Called the PMD today who asked him to come to the ED with concern for possible blood clot. No diarrhea. No fever. No cough. No prior hx of breathing problems. Nothing makes sx's better or worse. Past Medical History:   Diagnosis Date    Falls     Fatigue     Headache     Hearing loss     Heart disease     Hypercholesterolemia     Hypertension     Kidney disease     Memory loss     Muscle weakness     Swallowing difficulty        Past Surgical History:   Procedure Laterality Date    HX AORTIC VALVE REPLACEMENT      HX APPENDECTOMY      HX CORONARY STENT PLACEMENT           Family History:   Problem Relation Age of Onset    Heart Disease Father     Cancer Sister     Dementia Mother     Cancer Sister        Social History     Social History    Marital status:      Spouse name: N/A    Number of children: N/A    Years of education: N/A     Occupational History    Not on file. Social History Main Topics    Smoking status: Never Smoker    Smokeless tobacco: Never Used    Alcohol use No    Drug use: No    Sexual activity: No     Other Topics Concern    Not on file     Social History Narrative         ALLERGIES: Review of patient's allergies indicates no known allergies. Review of Systems   Review of Systems   Constitutional: (-) weight loss. HEENT: (-) stiff neck   Eyes: (-) discharge. Respiratory: (-) for cough. Cardiovascular: (-) syncope. Gastrointestinal: (-) blood in stool. Genitourinary: (-) hematuria. Musculoskeletal: (-) myalgias. Neurological: (-) seizure. Skin: (-) petechiae  Lymph/Immunologic: (-) enlarged lymph nodes  All other systems reviewed and are negative. Vitals:    07/24/17 0919   BP: (!) 170/111   Pulse: 84   Resp: 18   Temp: 97.4 °F (36.3 °C)   SpO2: 96%   Weight: 74.8 kg (165 lb)   Height: 5' 8\" (1.727 m)            Physical Exam Nursing note and vitals reviewed. Constitutional: oriented to person, place, and time. Appears elderly and frail. No distress. Head: Normocephalic and atraumatic. Sclera anicteric  Nose: No rhinorrhea  Mouth/Throat: Oropharynx is clear and moist. Pharynx normal  Eyes: Conjunctivae are normal. Pupils are equal, round, and reactive to light. Right eye exhibits no discharge. Left eye exhibits no discharge. Neck: Painless normal range of motion. Neck supple. No LAD. Cardiovascular: Normal rate, regular rhythm, normal heart sounds and intact distal pulses. Exam reveals no gallop and no friction rub. No murmur heard. Pulmonary/Chest:  No respiratory distress. No wheezes. No rales. No rhonchi. No increased work of breathing. No accessory muscle use. Good air exchange throughout. Abdominal: soft, non-tender, no rebound or guarding. No hepatosplenomegaly. Normal bowel sounds throughout. Back: no tenderness to palpation, no deformities, no CVA tenderness  Extremities/Musculoskeletal: Normal range of motion. no tenderness. No edema. Distal extremities are neurovasc intact. RLE is diffusely ecchymotic (old bruises) and swollen compared to the LLE. Lymphadenopathy:   No adenopathy. Neurological:  Alert and oriented to person, place, and time. Coordination normal. CN 2-12 intact. Motor and sensory function intact. Skin: Skin is warm and dry. No rash noted. No pallor. MDM 80y M here with trouble breathing, vomiting, and leg pain. Concern that maybe he had a DVT and now PE? Will check labs and imaging.     ED Course Procedures    ED EKG interpretation:  Rhythm: normal sinus rhythm; and regular . Rate (approx.): 83; Axis: normal; P wave: normal; QRS interval: normal ; ST/T wave: normal;  This EKG was interpreted by Gail Alfonso MD,ED Provider.

## 2017-07-24 NOTE — Clinical Note
Patient Class[de-identified] Observation [762] Type of Bed: Telemetry Remote [29] Reason for Observation: dyspnea Admitting Diagnosis: Dyspnea [996327] Admitting Physician: [address] [place] [address] Brian Blakely  Attending Physician: [address] [place] [address] [place] [phone]

## 2017-07-24 NOTE — IP AVS SNAPSHOT
[address] 
[phone] Patient: Freddy Reyna MRN: [id number] You are allergic to the following No active allergies Recent Documentation Height Weight BMI Smoking Status 1.727 m 71.5 kg 23.97 kg/m2 Never Smoker Emergency Contacts Name Discharge Info Relation Home Work Mobile [id number] [employer] CAREGIVER [3] Spouse [3] [phone] About your hospitalization You were admitted on:  2017 You last received care in the:  [facility] 3 PROG CARE TELE 2 You were discharged on:  2017 Unit phone number:  [phone] Why you were hospitalized Your primary diagnosis was:  Dyspnea Your diagnoses also included:  Anemia, Hyponatremia, Arf (Acute Renal Failure) (Hcc), Hypertension, Hypercholesterolemia, H/O Aortic Aneurysm Repair, Dysphagia, Cad (Coronary Artery Disease), Native Coronary Artery, Ckd (Chronic Kidney Disease) Stage 3, Gfr 30-59 Ml/Min, Debilitated Patient, Dementia, Chf, Acute (Hcc), Pulmonary Hypertension (Hcc), Systolic Chf, Chronic (Hcc) Providers Seen During Your Hospitalizations Provider Role Specialty Primary office phone Fanny Munoz MD Attending Provider Emergency Medicine [phone] Angela Patricia MD Attending Provider Internal Medicine [phone] Your Primary Care Physician (PCP) Primary Care Physician Office Phone Office Fax Papi Yoon [phone] Follow-up Information Follow up With Details Comments Contact Info Romana Alexander, MD Go on 2017 at 3 PM 5100 [facility] 7 [id number] 
[phone] [facility] home health OT/PT/Speech/Nursing [address] [facility] [address] [phone]  Cole Fortune MD Schedule an appointment as soon as possible for a visit in 1 week  [place] [address] [address] [place]  
[phone] Current Discharge Medication List  
  
START taking these medications Dose & Instructions Dispensing Information Comments Morning Noon Evening Bedtime  
 amLODIPine 5 mg tablet Commonly known as:  Darci Calabrese Your last dose was: Your next dose is:    
   
   
 Dose:  5 mg Take 1 Tab by mouth daily for 30 days. Quantity:  30 Tab Refills:  0  
     
   
   
   
  
 furosemide 20 mg tablet Commonly known as:  LASIX Your last dose was: Your next dose is:    
   
   
 Dose:  20 mg Take 1 Tab by mouth daily for 30 days. Quantity:  30 Tab Refills:  0 CONTINUE these medications which have CHANGED Dose & Instructions Dispensing Information Comments Morning Noon Evening Bedtime  
 carvedilol 12.5 mg tablet Commonly known as:  Alicia Clayton What changed:   
- how much to take 
- how to take this Your last dose was: Your next dose is:    
   
   
 Dose:  25 mg Take 2 Tabs by mouth two (2) times a day for 30 days. Quantity:  120 Tab Refills:  0 CONTINUE these medications which have NOT CHANGED Dose & Instructions Dispensing Information Comments Morning Noon Evening Bedtime FISH OIL 1,000 mg Cap Generic drug:  omega-3 fatty acids-vitamin e Your last dose was: Your next dose is:    
   
   
 Dose:  1 Cap Take 1 Cap by mouth daily. Refills:  0  
     
   
   
   
  
 simvastatin 20 mg tablet Commonly known as:  ZOCOR Your last dose was: Your next dose is:    
   
   
 Dose:  20 mg Take 1 Tab by mouth nightly. Quantity:  90 Tab Refills:  3 Where to Get Your Medications Information on where to get these meds will be given to you by the nurse or doctor. ! Ask your nurse or doctor about these medications amLODIPine 5 mg tablet  
 carvedilol 12.5 mg tablet  
 furosemide 20 mg tablet Discharge Instructions Patient Discharge Instructions Juvenal Urbina / [id number] : 3/11/1930 Admitted 2017 Discharged: 2017 Primary Diagnoses Problem List as of 2017  Date Reviewed: 2017 Codes Class Noted - Resolved Pulmonary hypertension ([facility] [address] Systolic CHF, chronic ([facility] [address] * (Principal)Dyspnea CKD (chronic kidney disease) stage 3, GFR 30-59 ml/min Debilitated patient Dementia Dysphagia Hypercholesterolemia Hypertension Anemia Hyponatremia ARF (acute renal failure) ([facility] [address] CHF, acute ([facility] [address] CAD (coronary artery disease), native coronary artery H/O aortic aneurysm repair Take Home Medications · It is important that you take the medication exactly as they are prescribed. · Keep your medication in the bottles provided by the pharmacist and keep a list of the medication names, dosages, and times to be taken in your wallet. · Do not take other medications without consulting your doctor. What to do at [facility] Recommended diet: Cardiac Diet, Low fat, Low cholesterol and limit fluid intake Recommended activity: Activity as tolerated and PT/OT per Home Health If you experience worse symptoms, please follow up with your PCP or cardiology. ** See Dr. Aletha Glez on  at [phone] at [facility] [address] [phone] Follow-up with your PCP and cardiology in a few weeks Information obtained by : 
I understand that if any problems occur once I am at home I am to contact my physician. I understand and acknowledge receipt of the instructions indicated above. Physician's or R.N.'s Signature                                                                  Date/Time Patient or Representative Signature                                                          Date/Time Heart Failure: Care Instructions Your Care Instructions Heart failure occurs when your heart does not pump as much blood as the body needs. Failure does not mean that the heart has stopped pumping but rather that it is not pumping as well as it should. Over time, this causes fluid buildup in your lungs and other parts of your body. Fluid buildup can cause shortness of breath, fatigue, swollen ankles, and other problems. By taking medicines regularly, reducing sodium (salt) in your diet, checking your weight every day, and making lifestyle changes, you can feel better and live longer. Follow-up care is a key part of your treatment and safety. Be sure to make and go to all appointments, and call your doctor if you are having problems. It's also a good idea to know your test results and keep a list of the medicines you take. How can you care for yourself at home? Medicines · Be safe with medicines. Take your medicines exactly as prescribed. Call your doctor if you think you are having a problem with your medicine. · Do not take any vitamins, over-the-counter medicine, or herbal products without talking to your doctor first. Mady Guzmanther not take ibuprofen (Advil or Motrin) and naproxen (Aleve) without talking to your doctor first. They could make your heart failure worse. · You may be taking some of the following medicine. ¨ Beta-blockers can slow heart rate, decrease blood pressure, and improve your condition. Taking a beta-blocker may lower your chance of needing to be hospitalized. ¨ Angiotensin-converting enzyme inhibitors (ACEIs) reduce the heart's workload, lower blood pressure, and reduce swelling. Taking an ACEI may lower your chance of needing to be hospitalized again. ¨ Angiotensin II receptor blockers (ARBs) work like ACEIs. Your doctor may prescribe them instead of ACEIs. ¨ Diuretics, also called water pills, reduce swelling. ¨ Potassium supplements replace this important mineral, which is sometimes lost with diuretics. ¨ Aspirin and other blood thinners prevent blood clots, which can cause a stroke or heart attack. You will get more details on the specific medicines your doctor prescribes. Diet · Your doctor may suggest that you limit sodium to 2,000 milligrams (mg) a day or less. That is less than 1 teaspoon of salt a day, including all the salt you eat in cooking or in packaged foods. People get most of their sodium from processed foods. Fast food and restaurant meals also tend to be very high in sodium. · Ask your doctor how much liquid you can drink each day. You may have to limit liquids. Weight · Weigh yourself without clothing at the same time each day. Record your weight. Call your doctor if you have a sudden weight gain, such as more than 2 to 3 pounds in a day or 5 pounds in a week. (Your doctor may suggest a different range of weight gain.) A sudden weight gain may mean that your heart failure is getting worse. Activity level · Start light exercise (if your doctor says it is okay). Even if you can only do a small amount, exercise will help you get stronger, have more energy, and manage your weight and your stress. Walking is an easy way to get exercise. Start out by walking a little more than you did before. Bit by bit, increase the amount you walk. · When you exercise, watch for signs that your heart is working too hard. You are pushing yourself too hard if you cannot talk while you are exercising. If you become short of breath or dizzy or have chest pain, stop, sit down, and rest. 
· If you feel \"wiped out\" the day after you exercise, walk slower or for a shorter distance until you can work up to a better pace. · Get enough rest at night. Sleeping with 1 or 2 pillows under your upper body and head may help you breathe easier. Lifestyle changes · Do not smoke. Smoking can make a heart condition worse. If you need help quitting, talk to your doctor about stop-smoking programs and medicines. These can increase your chances of quitting for good. Quitting smoking may be the most important step you can take to protect your heart. · Limit alcohol to 2 drinks a day for men and 1 drink a day for women. Too much alcohol can cause health problems. · Avoid getting sick from colds and the flu. Get a pneumococcal vaccine shot. If you have had one before, ask your doctor whether you need another dose. Get a flu shot each year. If you must be around people with colds or the flu, wash your hands often. When should you call for help? Call 911 if you have symptoms of sudden heart failure such as: 
· You have severe trouble breathing. · You cough up pink, foamy mucus. · You have a new irregular or rapid heartbeat. Call your doctor now or seek immediate medical care if: 
· You have new or increased shortness of breath. · You are dizzy or lightheaded, or you feel like you may faint. · You have sudden weight gain, such as more than 2 to 3 pounds in a day or 5 pounds in a week. (Your doctor may suggest a different range of weight gain.) · You have increased swelling in your legs, ankles, or feet. · You are suddenly so tired or weak that you cannot do your usual activities. Watch closely for changes in your health, and be sure to contact your doctor if: 
· You develop new symptoms. Where can you learn more? Go to http://harsh-ralph.info/. Enter J415 in the search box to learn more about \"Heart Failure: Care Instructions. \" Current as of: April 3, 2017 Content Version: 11.3 © 9994-1446 [employer].  Care instructions adapted under license by [address] (which disclaims liability or warranty for this information). If you have questions about a medical condition or this instruction, always ask your healthcare professional. [address] any warranty or liability for your use of this information. Discharge Instructions Attachments/References AMLODIPINE (BY MOUTH) (ENGLISH) FUROSEMIDE (BY MOUTH) (ENGLISH) Discharge Orders None [facility] Announcement We are excited to announce that we are making your provider's discharge notes available to you in Berst. You will see these notes when they are completed and signed by the physician that discharged you from your recent hospital stay. If you have any questions or concerns about any information you see in Berst, please call the Health Information Department where you were seen or reach out to your Primary Care Provider for more information about your plan of care. Introducing [facility]! [facility] introduces Berst patient portal. Now you can access parts of your medical record, email your doctor's office, and request medication refills online. 1. In your internet browser, go to [email or web address] 2. Click on the First Time User? Click Here link in the Sign In box. You will see the New Member Sign Up page. 3. Enter your Berst Access Code exactly as it appears below. You will not need to use this code after youve completed the sign-up process. If you do not sign up before the expiration date, you must request a new code. · Berst Access Code: [email or web address] Expires: 7/30/2017 10:58 AM 
 
4. Enter the last four digits of your Social Security Number (xxxx) and Date of Birth (mm/dd/yyyy) as indicated and click Submit. You will be taken to the next sign-up page. 5. Create a Berst ID.  This will be your Berst login ID and cannot be changed, so think of one that is secure and easy to remember. 6. Create a Certess password. You can change your password at any time. 7. Enter your Password Reset Question and Answer. This can be used at a later time if you forget your password. 8. Enter your e-mail address. You will receive e-mail notification when new information is available in [address] 9. Click Sign Up. You can now view and download portions of your medical record. 10. Click the Download Summary menu link to download a portable copy of your medical information. If you have questions, please visit the Frequently Asked Questions section of the Certess website. Remember, Certess is NOT to be used for urgent needs. For medical emergencies, dial 911. Now available from your iPhone and Android! General Information Please provide this summary of care documentation to your next provider. Patient Signature:  ____________________________________________________________ Date:  ____________________________________________________________  
  
Reji Cart Provider Signature:  ____________________________________________________________ Date:  ____________________________________________________________ More Information Amlodipine (By mouth) Amlodipine (ch-KKP-xj-peen) Treats high blood pressure and angina (chest pain). This medicine is a calcium channel blocker. Brand Name(s): Norvasc There may be other brand names for this medicine. When This Medicine Should Not Be Used: This medicine is not right for everyone. Do not use it if you had an allergic reaction to amlodipine. How to Use This Medicine:  
Tablet, Dissolving Tablet · Take your medicine as directed. Your dose may need to be changed several times to find what works best for you. Take this medicine at the same time each day. · Read and follow the patient instructions that come with this medicine. Talk to your doctor or pharmacist if you have any questions. · Missed dose: Take a dose as soon as you remember. If it has been more than 12 hours since you were supposed to take your dose, skip the missed dose and take your next regular dose at the regular time. · Store the medicine in a closed container at room temperature, away from heat, moisture, and direct light. Drugs and Foods to Avoid: Ask your doctor or pharmacist before using any other medicine, including over-the-counter medicines, vitamins, and herbal products. · Some medicines can affect how amlodipine works. Tell your doctor if you are also using any of the following: ¨ Clarithromycin, cyclosporine, diltiazem, itraconazole, ritonavir, sildenafil, simvastatin, tacrolimus Warnings While Using This Medicine: · Tell your doctor if you are pregnant or breastfeeding, or if you have liver disease, heart disease, coronary artery disease, or aortic stenosis. · This medicine could lower your blood pressure too much, especially when you first use it or if you are dehydrated. Stand or sit up slowly if you feel lightheaded or dizzy. · Your doctor will check your progress and the effects of this medicine at regular visits. Keep all appointments. · Do not stop using this medicine without asking your doctor, even if you feel well. This medicine will not cure high blood pressure, but it will help keep it in normal range. You may have to take blood pressure medicine for the rest of your life. · Keep all medicine out of the reach of children. Never share your medicine with anyone. Possible Side Effects While Using This Medicine:  
Call your doctor right away if you notice any of these side effects: · Allergic reaction: Itching or hives, swelling in your face or hands, swelling or tingling in your mouth or throat, chest tightness, trouble breathing · Lightheadedness, dizziness · New or worsening chest pain · Swelling in your hands, ankles, or legs · Trouble breathing, nausea, unusual sweating, fainting If you notice other side effects that you think are caused by this medicine, tell your doctor. Call your doctor for medical advice about side effects. You may report side effects to FDA at 8-130-FDA-1304 © 2017 [address] Information is for End User's use only and may not be sold, redistributed or otherwise used for commercial purposes. The above information is an  only. It is not intended as medical advice for individual conditions or treatments. Talk to your doctor, nurse or pharmacist before following any medical regimen to see if it is safe and effective for you. Furosemide (By mouth) Furosemide (dtyd-RB-xt-mide) Treats fluid retention (edema) and high blood pressure. This medicine is a diuretic (water pill). Brand Name(s): Active-Medicated Specimen Collection Kit, Diuscreen Multi-Drug Medicated Test Kit, Lasix, RX-Specimen Collection Kit, Specimen Collection Kit There may be other brand names for this medicine. When This Medicine Should Not Be Used: This medicine is not right for everyone. Do not use it if you had an allergic reaction to furosemide. How to Use This Medicine:  
Liquid, Tablet · Take your medicine as directed. Your dose may need to be changed several times to find what works best for you. · You may take this medicine with food if it upsets your stomach. · Oral liquid: Measure the oral liquid medicine with a marked measuring spoon, oral syringe, or medicine cup. · Tablet: Swallow the tablet whole. Do not crush, break, or chew it. · Missed dose: Take a dose as soon as you remember. If it is almost time for your next dose, wait until then and take a regular dose. Do not take extra medicine to make up for a missed dose. · Store the medicine in a closed container at room temperature, away from heat, moisture, and direct light. Drugs and Foods to Avoid: Ask your doctor or pharmacist before using any other medicine, including over-the-counter medicines, vitamins, and herbal products. · Some medicines can affect how furosemide works. Tell your doctor if you are also using any of the following: ¨ Cisplatin, cyclosporine, digoxin, ethacrynic acid, licorice, lithium, methotrexate, or phenytoin ¨ Adrenocorticotropic hormone (ACTH) ¨ Laxative ¨ Medicine to treat an infection ¨ NSAID pain or arthritis medicine (including aspirin, diclofenac, ibuprofen, indomethacin, naproxen) ¨ Other blood pressure medicines ¨ Steroid medicine (including dexamethasone, hydrocortisone, methylprednisolone, prednisolone, prednisone) ¨ Thyroid medicine · If you also take sucralfate, allow at least 2 hours between the time you take furosemide and the time you take sucralfate. · Alcohol, narcotic pain medicine, or sleeping pills may cause you to feel more lightheaded, dizzy, or faint when used with this medicine. Warnings While Using This Medicine: · Tell your doctor if you are pregnant or breastfeeding, or if you have kidney disease, liver disease (including cirrhosis), diabetes, gout, low blood pressure, lupus, an enlarged prostate, trouble urinating, or an allergy to sulfa drugs. Tell your doctor if you are on a low-salt diet. · This medicine may cause the following problems:  
¨ Low levels of minerals in your blood, such as potassium and sodium ¨ Blood sugar level changes ¨ Hearing problems · Make sure any doctor or dentist who treats you knows that you are using this medicine. · This medicine could lower your blood pressure too much, especially when you first use it or if you are dehydrated. Stand or sit up slowly if you feel lightheaded or dizzy. · This medicine may make your skin more sensitive to sunlight. Wear sunscreen. Do not use sunlamps or tanning beds.  
· Your doctor will do lab tests at regular visits to check on the effects of this medicine. Keep all appointments. · Keep all medicine out of the reach of children. Never share your medicine with anyone. Possible Side Effects While Using This Medicine:  
Call your doctor right away if you notice any of these side effects: · Allergic reaction: Itching or hives, swelling in your face or hands, swelling or tingling in your mouth or throat, chest tightness, trouble breathing · Blistering, peeling, red skin rash · Confusion, weakness, muscle twitching · Dry mouth, increased thirst, muscle cramps, uneven heartbeat · Sudden and severe stomach pain, nausea, vomiting, fever, lightheadedness · Hearing loss, ringing in the ears · Lightheadedness, dizziness, fainting · Severe diarrhea · Unusual bleeding or bruising · Yellow skin or eyes If you notice these less serious side effects, talk with your doctor: · Loss of appetite, stomach cramps If you notice other side effects that you think are caused by this medicine, tell your doctor. Call your doctor for medical advice about side effects. You may report side effects to FDA at 8-257-FDA-4795 © 2017 [address] Information is for End User's use only and may not be sold, redistributed or otherwise used for commercial purposes. The above information is an  only. It is not intended as medical advice for individual conditions or treatments. Talk to your doctor, nurse or pharmacist before following any medical regimen to see if it is safe and effective for you.

## 2017-07-24 NOTE — PROGRESS NOTES
Shift Summary    1900    Bedside and Verbal shift change report given to Lula Braxton RN (oncoming nurse) by Colton Gonzalez RN (offgoing nurse). Report included the following information SBAR, Kardex, MAR, Recent Results and Cardiac Rhythm  . Sitter at bedside. Patient pleasant with no issues overnight. Alert to self only. Bedside and Verbal shift change report given to Kingsley Nice (oncoming nurse) by Lula Braxton RN (offgoing nurse). Report included the following information SBAR, Kardex, MAR, Recent Results and Cardiac Rhythm  .

## 2017-07-24 NOTE — IP AVS SNAPSHOT
[address] 
[phone] Patient: Ace Perez MRN: [id number] IDF:[id number] Current Discharge Medication List  
  
START taking these medications Dose & Instructions Dispensing Information Comments Morning Noon Evening Bedtime  
 amLODIPine 5 mg tablet Commonly known as:  Achilles [place] Your last dose was: Your next dose is:    
   
   
 Dose:  5 mg Take 1 Tab by mouth daily for 30 days. Quantity:  30 Tab Refills:  0  
     
   
   
   
  
 furosemide 20 mg tablet Commonly known as:  LASIX Your last dose was: Your next dose is:    
   
   
 Dose:  20 mg Take 1 Tab by mouth daily for 30 days. Quantity:  30 Tab Refills:  0 CONTINUE these medications which have CHANGED Dose & Instructions Dispensing Information Comments Morning Noon Evening Bedtime  
 carvedilol 12.5 mg tablet Commonly known as:  Odin Mood What changed:   
- how much to take 
- how to take this Your last dose was: Your next dose is:    
   
   
 Dose:  25 mg Take 2 Tabs by mouth two (2) times a day for 30 days. Quantity:  120 Tab Refills:  0 CONTINUE these medications which have NOT CHANGED Dose & Instructions Dispensing Information Comments Morning Noon Evening Bedtime FISH OIL 1,000 mg Cap Generic drug:  omega-3 fatty acids-vitamin e Your last dose was: Your next dose is:    
   
   
 Dose:  1 Cap Take 1 Cap by mouth daily. Refills:  0  
     
   
   
   
  
 simvastatin 20 mg tablet Commonly known as:  ZOCOR Your last dose was: Your next dose is:    
   
   
 Dose:  20 mg Take 1 Tab by mouth nightly. Quantity:  90 Tab Refills:  3 Where to Get Your Medications Information on where to get these meds will be given to you by the nurse or doctor. ! Ask your nurse or doctor about these medications  
  amLODIPine 5 mg tablet  
 carvedilol 12.5 mg tablet  
 furosemide 20 mg tablet

## 2017-07-24 NOTE — H&P
[address] 19  [phone]    Hospitalist Admission Note      NAME:  Kelly Marquez   :   3/11/1930   MRN:  [id number]     PCP:  Kiki Zee MD     Date/Time:  2017 11:54 AM          Subjective:     CHIEF COMPLAINT: dyspnea     HISTORY OF PRESENT ILLNESS:     Mr. Marshia Holter is a 80 y.o.  male who presented to the Emergency Department complaining of dypsnea. He provides no reliable hx due to dementia. Wife is present to provide some hx. Patient with 3 days of worsening dyspnea, mainly orthopnea. Recently has nausea without vomiting. 2 weeks ago fall with subsequent severe bruising and persistent pain. ER workup shows pulmonary edema and tachypnea. Chart records indicate weight gain. We will admit him for observation to assess for CHF. Past Medical History:   Diagnosis Date    CAD (coronary artery disease), native coronary artery     CKD (chronic kidney disease) stage 3, GFR 30-59 ml/min     Debilitated patient     Dementia     Dysphagia     Hearing loss     Hypercholesterolemia     Hypertension         Past Surgical History:   Procedure Laterality Date    HX AORTIC VALVE REPLACEMENT      HX APPENDECTOMY      HX CORONARY STENT PLACEMENT         Social History   Substance Use Topics    Smoking status: Never Smoker    Smokeless tobacco: Never Used    Alcohol use No        Family History   Problem Relation Age of Onset    Heart Disease Father     Cancer Sister     Dementia Mother     Cancer Sister         No Known Allergies     Prior to Admission medications    Medication Sig Start Date End Date Taking? Authorizing Provider   carvedilol (COREG) 12.5 mg tablet 12.5 mg two (2) times a day. 17  Yes Historical Provider   simvastatin (ZOCOR) 20 mg tablet Take 1 Tab by mouth nightly. 10/19/16  Yes Nigel Vitale MD   omega-3 fatty acids-vitamin e (FISH OIL) 1,000 mg cap Take 1 Cap by mouth.    Yes Historical Provider   traMADol (ULTRAM) 50 mg tablet Take 1 Tab by mouth every six (6) hours as needed for Pain.  Max Daily Amount: 200 mg. 7/16/17   Mt Tran DO       Review of Systems: Vague and indirect  (bold if positive, if negative)    Gen: fatigueEyes:  ENT:   decreased hearingCVS:  dizzinessPulm:  dyspneaGI:  nausea  :    MS:  arthritisSkin:  Psych:  Endo:    Hem:  bruisingRenal:    Neuro:  weakness      Objective:      VITALS:    Vital signs reviewed; most recent are:    Visit Vitals    BP (!) 170/111 (BP 1 Location: Right arm, BP Patient Position: At rest)    Pulse 84    Temp 97.4 °F (36.3 °C)    Resp 18    Ht 5' 8\" (1.727 m)    Wt 74.8 kg (165 lb)    SpO2 96%    BMI 25.09 kg/m2     SpO2 Readings from Last 6 Encounters:   07/24/17 96%   07/16/17 91%   05/01/17 96%   10/19/16 98%   06/07/16 95%   10/07/15 98%        No intake or output data in the 24 hours ending 07/24/17 1154     Exam:     Physical Exam:    Gen:  Well-developed, well-nourished, in no acute distress  HEENT:  Pink conjunctivae, PERRL, hearing intact to voice, moist mucous membranes  Neck:  Supple, without masses, thyroid non-tender  Resp:  No accessory muscle use, clear breath sounds with rales  Card:  No murmurs, normal S1, S2 without thrills, bruits or peripheral edema  Abd:  Soft, non-tender, non-distended, normoactive bowel sounds are present, no mass  Lymph:  No cervical or inguinal adenopathy  Musc:  No cyanosis or clubbing, RLE with extensive bruising  Skin:  No rashes or ulcers, skin turgor is good  Neuro:  Cranial nerves are grossly intact, general motor weakness, follows commands vaguely  Psych:  Poor insight, oriented to person     Labs:    Recent Labs      07/24/17   1008   WBC  7.0   HGB  8.7*   HCT  26.0*   PLT  122*     Recent Labs      07/24/17   1008   NA  133*   K  3.9   CL  99   CO2  23   GLU  108*   BUN  30*   CREA  2.28*   CA  8.0*   ALB  3.0*   TBILI  1.0   SGOT  20   ALT  28     No results found for: GLUCPOC  No results for input(s): PH, PCO2, PO2, HCO3, FIO2 in the last 72 hours. No results for input(s): INR in the last 72 hours. No lab exists for component: INREXT  All Micro Results     None          I have reviewed previous records       Assessment and Plan:      Dyspnea - POA, not hypoxic, but tachypnea and pulm edema on xray. I think likely CHF. Check ECHO. Consult cards. Daily weight. Lasix now. CAD (coronary artery disease), native coronary artery / Hypertension - BP Elevated, likely due to anxiety, dyspnea, and CHF. Monitor with diuresis. Resume coreg. Troponin 0.06 likely strain alone. ARF (acute renal failure) / Hyponatremia / CKD (chronic kidney disease) stage 3 - POA, likely multifactorial.  May have some ARF from recent nausea anorexia. May have had rhabdo from recent leg injury. May have poor flow from CHF. Monitor with diuresis. Consult renal.     Anemia - Moderate, may improve with diuresis. Check serologies. No report of bleeding. Debilitated patient / Dysphagia - Fall and aspiration precautions. PT/OT/Speech eval, but likely back to [facility]    Hx aortic aneurysm repair - ECHO to assess as possible. Dementia - Supportive care. Followed by Dr Nelda Swanson. Intolerant of meds. Hypercholesterolemia - Per records, but check panel.  May not warrant statin with moderate and worsening dementia     Telemetry reviewed:   normal sinus rhythm    Risk of deterioration: high      Total time spent with patient: 79 [address] discussed with: Patient, Family, Nursing Staff and >50% of time spent in counseling and coordination of care    Discussed:  Care Plan       ___________________________________________________    Attending Physician: Pato Samano MD

## 2017-07-24 NOTE — PROGRESS NOTES
Admission/Shift Summary    Pt admitted to [facility] arriving on unit at 1650 from 5th floor. Admitting VSS, pt in no acute distress at time of arrival. Family and RN accompanied pt to unit. Reviewed plan of care w/pt and family. Updated pt's white board. No significant changes in patient condition or concerns during my shift. Bedside and Verbal shift change report given to Carlos (oncoming nurse) by Safia Delaney (offgoing nurse). Report included the following information SBAR, Kardex, Intake/Output, MAR, Accordion, Recent Results and Cardiac Rhythm NSR.

## 2017-07-25 ENCOUNTER — APPOINTMENT (OUTPATIENT)
Dept: NUCLEAR MEDICINE | Age: 82
DRG: 291 | End: 2017-07-25
Attending: INTERNAL MEDICINE
Payer: MEDICARE

## 2017-07-25 LAB
ANION GAP BLD CALC-SCNC: 9 MMOL/L (ref 5–15)
BUN SERPL-MCNC: 32 MG/DL (ref 6–20)
BUN/CREAT SERPL: 14 (ref 12–20)
CALCIUM SERPL-MCNC: 7.7 MG/DL (ref 8.5–10.1)
CHLORIDE SERPL-SCNC: 100 MMOL/L (ref 97–108)
CO2 SERPL-SCNC: 25 MMOL/L (ref 21–32)
CREAT SERPL-MCNC: 2.32 MG/DL (ref 0.7–1.3)
ERYTHROCYTE [DISTWIDTH] IN BLOOD BY AUTOMATED COUNT: 15.9 % (ref 11.5–14.5)
GLUCOSE SERPL-MCNC: 106 MG/DL (ref 65–100)
HCT VFR BLD AUTO: 24.1 % (ref 36.6–50.3)
HGB BLD-MCNC: 7.9 G/DL (ref 12.1–17)
MAGNESIUM SERPL-MCNC: 1.8 MG/DL (ref 1.6–2.4)
MCH RBC QN AUTO: 29.3 PG (ref 26–34)
MCHC RBC AUTO-ENTMCNC: 32.8 G/DL (ref 30–36.5)
MCV RBC AUTO: 89.3 FL (ref 80–99)
PHOSPHATE SERPL-MCNC: 4.7 MG/DL (ref 2.6–4.7)
PLATELET # BLD AUTO: 107 K/UL (ref 150–400)
POTASSIUM SERPL-SCNC: 3.4 MMOL/L (ref 3.5–5.1)
RBC # BLD AUTO: 2.7 M/UL (ref 4.1–5.7)
SODIUM SERPL-SCNC: 134 MMOL/L (ref 136–145)
WBC # BLD AUTO: 5.2 K/UL (ref 4.1–11.1)

## 2017-07-25 PROCEDURE — A9567 TECHNETIUM TC-99M AEROSOL: HCPCS

## 2017-07-25 PROCEDURE — 85027 COMPLETE CBC AUTOMATED: CPT | Performed by: INTERNAL MEDICINE

## 2017-07-25 PROCEDURE — 74011250637 HC RX REV CODE- 250/637: Performed by: INTERNAL MEDICINE

## 2017-07-25 PROCEDURE — 36415 COLL VENOUS BLD VENIPUNCTURE: CPT | Performed by: INTERNAL MEDICINE

## 2017-07-25 PROCEDURE — 97161 PT EVAL LOW COMPLEX 20 MIN: CPT

## 2017-07-25 PROCEDURE — 83735 ASSAY OF MAGNESIUM: CPT | Performed by: INTERNAL MEDICINE

## 2017-07-25 PROCEDURE — 80048 BASIC METABOLIC PNL TOTAL CA: CPT | Performed by: INTERNAL MEDICINE

## 2017-07-25 PROCEDURE — 84100 ASSAY OF PHOSPHORUS: CPT | Performed by: INTERNAL MEDICINE

## 2017-07-25 PROCEDURE — 97116 GAIT TRAINING THERAPY: CPT

## 2017-07-25 PROCEDURE — 92610 EVALUATE SWALLOWING FUNCTION: CPT

## 2017-07-25 PROCEDURE — 97530 THERAPEUTIC ACTIVITIES: CPT

## 2017-07-25 PROCEDURE — 65660000000 HC RM CCU STEPDOWN

## 2017-07-25 PROCEDURE — 74011250637 HC RX REV CODE- 250/637: Performed by: SPECIALIST

## 2017-07-25 PROCEDURE — 97165 OT EVAL LOW COMPLEX 30 MIN: CPT

## 2017-07-25 PROCEDURE — 74011250636 HC RX REV CODE- 250/636: Performed by: INTERNAL MEDICINE

## 2017-07-25 RX ORDER — POTASSIUM CHLORIDE 750 MG/1
40 TABLET, FILM COATED, EXTENDED RELEASE ORAL EVERY 4 HOURS
Status: COMPLETED | OUTPATIENT
Start: 2017-07-25 | End: 2017-07-25

## 2017-07-25 RX ORDER — POTASSIUM CHLORIDE 750 MG/1
20 TABLET, FILM COATED, EXTENDED RELEASE ORAL
Status: COMPLETED | OUTPATIENT
Start: 2017-07-25 | End: 2017-07-25

## 2017-07-25 RX ADMIN — POTASSIUM CHLORIDE 20 MEQ: 750 TABLET, FILM COATED, EXTENDED RELEASE ORAL at 08:54

## 2017-07-25 RX ADMIN — SIMVASTATIN 20 MG: 20 TABLET, FILM COATED ORAL at 21:45

## 2017-07-25 RX ADMIN — HEPARIN SODIUM 5000 UNITS: 5000 INJECTION, SOLUTION INTRAVENOUS; SUBCUTANEOUS at 00:29

## 2017-07-25 RX ADMIN — CARVEDILOL 12.5 MG: 6.25 TABLET, FILM COATED ORAL at 17:49

## 2017-07-25 RX ADMIN — HEPARIN SODIUM 5000 UNITS: 5000 INJECTION, SOLUTION INTRAVENOUS; SUBCUTANEOUS at 17:49

## 2017-07-25 RX ADMIN — POTASSIUM CHLORIDE 40 MEQ: 750 TABLET, FILM COATED, EXTENDED RELEASE ORAL at 17:49

## 2017-07-25 RX ADMIN — Medication 10 ML: at 13:22

## 2017-07-25 RX ADMIN — CARVEDILOL 12.5 MG: 6.25 TABLET, FILM COATED ORAL at 08:54

## 2017-07-25 RX ADMIN — POTASSIUM CHLORIDE 40 MEQ: 750 TABLET, FILM COATED, EXTENDED RELEASE ORAL at 10:30

## 2017-07-25 RX ADMIN — FUROSEMIDE 20 MG: 20 TABLET ORAL at 08:54

## 2017-07-25 RX ADMIN — POTASSIUM CHLORIDE 40 MEQ: 750 TABLET, FILM COATED, EXTENDED RELEASE ORAL at 13:22

## 2017-07-25 RX ADMIN — AMLODIPINE BESYLATE 5 MG: 5 TABLET ORAL at 08:54

## 2017-07-25 RX ADMIN — Medication 10 ML: at 21:45

## 2017-07-25 RX ADMIN — HEPARIN SODIUM 5000 UNITS: 5000 INJECTION, SOLUTION INTRAVENOUS; SUBCUTANEOUS at 08:54

## 2017-07-25 NOTE — PROGRESS NOTES
Emilio Griffin [facility] [place] [facility]  [address] [facility], [place], [address] [place] [address]  [phone]      Medical Progress Note      NAME: Juvenal Urbina   :  3/11/1930  MRM:  [id number]    Date/Time: 2017  9:03 AM       Assessment and Plan:     Diastolic CHF, chronic / Dyspnea - POA, not hypoxic, but tachypnea and pulm edema on xray. EF 45% on ECHO. Consulted cards. Daily weight. Lasix PO. Continue coreg    CAD (coronary artery disease), native coronary artery / Hypertension - BP Elevated, likely due to anxiety, dyspnea, and CHF. Monitor with diuresis. Resume coreg. Troponin 0.06 likely strain alone. ARF (acute renal failure) / Hyponatremia / CKD (chronic kidney disease) stage 3 - POA, likely multifactorial.  May have some ARF from recent nausea anorexia. May have had rhabdo from recent leg injury. May have poor flow from CHF. Stable with diuresis. Renal outpatient follow up. Pulmonary hypertension / Elevated DDimer - Unclear etiology. PulmHTN moderate. DDimer may be from leg bruising, but check VQ scan to rule out PE. Anemia - Moderate, chronic disease. Normal serologies. No report of bleeding. Debilitated patient / Dysphagia - Fall and aspiration precautions. PT/OT/Speech eval, but likely back to [facility]    Hx aortic aneurysm repair - ECHO without defect. Dementia - Supportive care. Followed by Dr Christine Garsia. Intolerant of meds. Hypercholesterolemia - Per records, but LDL 41 on panel. May not warrant statin given worsening dementia        Subjective:     Chief Complaint:  Feels better, less dyspnea, leg better    ROS:  (bold if positive, if negative)      Tolerating some PT  Tolerating some Diet        Objective:     Last 24hrs VS reviewed since prior progress note.  Most recent are:    Visit Vitals    /85    Pulse 74    Temp 98 °F (36.7 °C)    Resp 20    Ht 5' 8\" (1.727 m)    Wt 71.5 kg (157 lb 10.1 oz)    SpO2 98%    BMI 23.97 kg/m2     SpO2 Readings from Last 6 Encounters:   07/25/17 98%   07/16/17 91%   05/01/17 96%   10/19/16 98%   06/07/16 95%   10/07/15 98%    O2 Flow Rate (L/min): 1.5 l/min     Intake/Output Summary (Last 24 hours) at 07/25/17 9559  Last data filed at 07/25/17 0507   Gross per 24 hour   Intake              360 ml   Output             1550 ml   Net            -1190 ml        Physical Exam:    Gen:  Well-developed, well-nourished, in no acute distress  HEENT:  Pink conjunctivae, PERRL, hearing intact to voice, moist mucous membranes  Neck:  Supple, without masses, thyroid non-tender  Resp:  No accessory muscle use, clear breath sounds without wheezes rales or rhonchi  Card:  No murmurs, normal S1, S2 without thrills, bruits, 1+ RLE peripheral edema  Abd:  Soft, non-tender, non-distended, normoactive bowel sounds are present, no mass  Lymph:  No cervical or inguinal adenopathy  Musc:  No cyanosis or clubbing  Skin:  No rashes or ulcers, skin turgor is good  Neuro:  Cranial nerves are grossly intact, general motor weakness, follows commands vaguely  Psych:  Poor insight, oriented to person, place    Telemetry reviewed:   normal sinus rhythm  __________________________________________________________________  Medications Reviewed: (see below)  Medications:     Current Facility-Administered Medications   Medication Dose Route Frequency    carvedilol (COREG) tablet 12.5 mg  12.5 mg Oral BID    simvastatin (ZOCOR) tablet 20 mg  20 mg Oral QHS    sodium chloride (NS) flush 5-10 mL  5-10 mL IntraVENous Q8H    sodium chloride (NS) flush 5-10 mL  5-10 mL IntraVENous PRN    naloxone (NARCAN) injection 0.4 mg  0.4 mg IntraVENous PRN    acetaminophen (TYLENOL) tablet 650 mg  650 mg Oral Q4H PRN    diphenhydrAMINE (BENADRYL) capsule 25 mg  25 mg Oral Q4H PRN    ondansetron (ZOFRAN) injection 4 mg  4 mg IntraVENous Q4H PRN    heparin (porcine) injection 5,000 Units  5,000 Units SubCUTAneous Q8H    furosemide (LASIX) tablet 20 mg  20 mg Oral DAILY    amLODIPine (NORVASC) tablet 5 mg  5 mg Oral DAILY        Lab Data Reviewed: (see below)  Lab Review:     Recent Labs      07/25/17   0049  07/24/17   1008   WBC  5.2  7.0   HGB  7.9*  8.7*   HCT  24.1*  26.0*   PLT  107*  122*     Recent Labs      07/25/17   0049  07/24/17   1008   NA  134*  133*   K  3.4*  3.9   CL  100  99   CO2  25  23   GLU  106*  108*   BUN  32*  30*   CREA  2.32*  2.28*   CA  7.7*  8.0*   MG  1.8   --    PHOS  4.7   --    ALB   --   3.0*   TBILI   --   1.0   SGOT   --   20   ALT   --   28     No results found for: GLUCPOC  No results for input(s): PH, PCO2, PO2, HCO3, FIO2 in the last 72 hours. No results for input(s): INR in the last 72 hours. No lab exists for component: INREXT  All Micro Results     None          I have reviewed notes of prior 24hr.     Other pertinent lab: none    Total time spent with patient: [address] [place] [address] discussed with: Patient, Care Manager, Nursing Staff, Consultant/Specialist and >50% of time spent in counseling and coordination of care    Discussed:  Care Plan and D/C Planning    Prophylaxis:  Hep SQ and H2B/PPI    Disposition:   PT, OT, RN           ___________________________________________________    Attending Physician: Nury Gusman MD

## 2017-07-25 NOTE — PROGRESS NOTES
Henna Plasencia MD   [facility] [address]    Office [phone]  Mobile [phone]                          Cardiology progress note    IMPRESSION:   1: (Probable subacute on chronic) CHF, POA, diastolic       Lung congestion, SOB POA       --> recompensated? I/O negative, but only by small amount, ?reliable. 2: Leg edema, R>L, probably partly CHF, but asymmetry suggests presence of other ?venous insufficiency       --> No DVT on Doppler, edema improved  3: Ischemic Heart disease, ?details: EKG suggests possible old IMI, and he had coronary stent in 1990;        --> Echo now: LVEF 45%, mild MR, TR  4: HBP  5: CRF, dementia, DDNR (per wife, POA). 6: Dysphagia  7: Recurrent falls, unclear whether any LOC or other definable/reversible cause. 8: Hx abdominal aortic aneurysm repair (NOT aortic valve surgery)  9: Anemia (recent), thrombocytopenia (old)       RECOMMENDATIONS / PLANS   He appears close to euvolemic. Would continue current CV Rx, ambulate, stop oxygen NP if sats okay. ?home tomorrow if does okay.        Subjective:     No complaints, unreliable        Objective:    Physical Exam:    Visit Vitals    /85    Pulse 74    Temp 98 °F (36.7 °C)    Resp 20    Ht 5' 8\" (1.727 m)    Wt 157 lb 10.1 oz (71.5 kg)    SpO2 98%    BMI 23.97 kg/m2        Appearance: frail    Cardiovascular: no murmur    Lungs: clear    Abdomen: non tender    Extremities: trace R edema, none on L      Laboratory and Imaging have been personally reviewed and are notable for:    EKG: Telem NSR    Labs:    Recent Labs      07/25/17 0049   NA  134*   K  3.4*   CL  100   BUN  32*   CREA  2.32*   GLU  106*   PHOS  4.7   CA  7.7*     Recent Labs      07/25/17 0049   WBC  5.2   HGB  7.9*   HCT  24.1*   PLT  107*

## 2017-07-25 NOTE — PROGRESS NOTES
Bedside and Verbal shift change report given to Noble Thornton (oncoming nurse) by Mercedes Mcdaniels (offgoing nurse). Report included the following information SBAR, Kardex, Intake/Output, MAR, Recent Results, Med Rec Status and Cardiac Rhythm Sinus Rhythm 1st degree AVB.

## 2017-07-25 NOTE — NURSE NAVIGATOR
Chart reviewed by Heart Failure Nurse Navigator. Heart Failure database completed. EF 45%. ACEi/ARB: not currently indicated. BB: coreg 12.5 mg, twice daily. CRT: not currently indicated. NYHA Functional Class documentation requested via Provider Message on [facility] [facility] Failure Teach Back in Patient Education. Heart Failure Avoiding Triggers on Discharge Instructions. Cardiologist:  Dr. Tana Templeton ([facility]) consulted. RAMIN Collins NN ([facility]) notified of admission. PCP does not have affiliated NN.

## 2017-07-25 NOTE — PROGRESS NOTES
Problem: Self Care Deficits Care Plan (Adult)  Goal: *Acute Goals and Plan of Care (Insert Text)  Occupational Therapy Goals  Initiated 7/25/2017  1. Patient will perform grooming with modified independence standing at sink >5 minutes within 7 day(s). 2. Patient will perform lower body dressing and bathing with supervision/set-up within 7 day(s). 3. Patient will perform toilet transfers with supervision/set-up within 7 day(s). 4. Patient will perform all aspects of toileting with supervision/set-up within 7 day(s). 5. Patient will participate in upper extremity therapeutic exercise/activities with modified independence for 10 minutes within 7 day(s). 6. Patient will utilize energy conservation techniques during functional activities with verbal cues within 7 day(s). OCCUPATIONAL THERAPY EVALUATION  Patient: Paula Zurita (34 y.o. male)  Date: 7/25/2017  Primary Diagnosis: Dyspnea  CHF, acute [facility])        Precautions:  Fall      ASSESSMENT :  Based on the objective data described below, the patient presents with decreased activity tolerance following admission on 7/24 for dyspnea. Patient has dementia, is unable to clarify background information, and having difficulty answering questions consistently throughout tx. Patient follows all commands, is cooperative, and motivated. Patient required supervision to for bed mobility and CGA to min A for OOB transfers, including to/from bathroom and on/off commode. He currently requires supervision/setup for UB ADLs and CGA to min A for LB ADLs. Patient was educated on safe transfer technique and adaptive ADL techniques; Suspect minimal carryover due to cognitive impairment therefore would benefit from re-education. Patient would benefit from continued skilled OT to progress towards goals and improve overall independence. Patient will benefit from skilled intervention to address the above impairments.   Patients rehabilitation potential is considered to be Good  Factors which may influence rehabilitation potential include:   [X]             None noted  [ ]             Mental ability/status  [ ]             Medical condition  [ ]             Home/family situation and support systems  [ ]             Safety awareness  [ ]             Pain tolerance/management  [ ]             Other:        PLAN :  Recommendations and Planned Interventions:  [X]               Self Care Training                  [X]        Therapeutic Activities  [X]               Functional Mobility Training    [ ]        Cognitive Retraining  [X]               Therapeutic Exercises           [X]        Endurance Activities  [ ]               Balance Training                   [ ]        Neuromuscular Re-Education  [ ]               Visual/Perceptual Training     [X]   Home Safety Training  [X]               Patient Education                 [X]        Family Training/Education  [ ]               Other (comment):     Frequency/Duration: Patient will be followed by occupational therapy 3 times a week to address goals. Discharge Recommendations: Home Health  Further Equipment Recommendations for Discharge: none at this time       SUBJECTIVE:   Patient was agreeable to OT evaluation. OBJECTIVE DATA SUMMARY:   HISTORY:   Past Medical History:   Diagnosis Date    CAD (coronary artery disease), native coronary artery      CKD (chronic kidney disease) stage 3, GFR 30-59 ml/min      Debilitated patient      Dementia      Dysphagia      Hearing loss      Hypercholesterolemia      Hypertension      Pulmonary hypertension (HCC)      Systolic CHF, chronic (HCC)       Past Surgical History:   Procedure Laterality Date    HX AORTIC VALVE REPLACEMENT        HX APPENDECTOMY        HX CORONARY STENT PLACEMENT            Prior Level of Function/Home Situation: Patient is an unreliable historian. No family present to clarify. Will need to confirm with family.        Home Situation  Home Environment: Apartment  # Steps to Enter: 0  One/Two Story Residence: One story  Living Alone: No  Support Systems: Friends \ neighbors, Family member(s)  Patient Expects to be Discharged to[de-identified] Apartment  Current DME Used/Available at Home: yoni Ignacio, [address] [employer] [address] chair  [X]  Right hand dominant             [ ]  Left hand dominant     EXAMINATION OF PERFORMANCE DEFICITS:  Cognitive/Behavioral Status:  Neurologic State: Alert  Orientation Level: Oriented to person  Cognition: Follows commands;Poor safety awareness  Perception: Appears intact  Perseveration: No perseveration noted  Safety/Judgement: Awareness of environment     Skin: intact in the uppers     Edema: None noted in the uppers     Hearing: Auditory  Auditory Impairment: None     Vision/Perceptual:    Tracking: Able to track stimulus in all quadrants w/o difficulty    Diplopia: No    Acuity: Within Defined Limits    Corrective Lenses: Glasses     Range of Motion:  WDL in the uppers     Strength:  Decreased but functional in the uppers     Coordination:  Fine Motor Skills-Upper: Left Intact; Right Intact    Gross Motor Skills-Upper: Left Intact; Right Intact     Tone & Sensation:  Tone: Normal  Sensation: Intact     Balance:  Sitting: Intact  Standing: Intact; With support     Functional Mobility and Transfers for ADLs:  Bed Mobility:  Supine to Sit: Supervision;Assist x1  Sit to Supine: Supervision;Assist x1  Scooting: Supervision;Assist x1     Transfers:  Sit to Stand: Minimum assistance;Assist x1  Stand to Sit: Contact guard assistance;Assist x1  Bed to Chair: Contact guard assistance;Assist x1  Toilet Transfer : Contact guard assistance;Assist x1     ADL Assessment:  Feeding: Supervision;Setup     Oral Facial Hygiene/Grooming: Supervision;Setup     Bathing: Contact guard assistance     Upper Body Dressing: Supervision;Setup     Lower Body Dressing: Minimum assistance     Toileting: Contact guard assistance     Cognitive Retraining  Safety/Judgement: Awareness of environment     Functional Measure:  Barthel Index:      Bathin  Bladder: 10  Bowels: 10  Groomin  Dressin  Feeding: 10  Mobility: 10  Stairs: 5  Toilet Use: 5  Transfer (Bed to Chair and Back): 10  Total: 70         Barthel and G-code impairment scale:  Percentage of impairment CH  0% CI  1-19% CJ  20-39% CK  40-59% CL  60-79% CM  80-99% CN  100%   Barthel Score 0-100 100 99-80 79-60 59-40 20-39 1-19    0   Barthel Score 0-20 20 17-19 13-16 9-12 5-8 1-4 0      The Barthel ADL Index: Guidelines  1. The index should be used as a record of what a patient does, not as a record of what a patient could do. 2. The main aim is to establish degree of independence from any help, physical or verbal, however minor and for whatever reason. 3. The need for supervision renders the patient not independent. 4. A patient's performance should be established using the best available evidence. Asking the patient, friends/relatives and nurses are the usual sources, but direct observation and common sense are also important. However direct testing is not needed. 5. Usually the patient's performance over the preceding 24-48 hours is important, but occasionally longer periods will be relevant. 6. Middle categories imply that the patient supplies over 50 per cent of the effort. 7. Use of aids to be independent is allowed. César Muñoz., Barthel, D.W. (8540). Functional evaluation: the Barthel Index. [facility] (14)2. Gaile Rubinstein colleen Jorje, J.J.MJAM, Francis Castellano., Shawna Mcdaniel., [place], [address] [place] [address] (). Measuring the change indisability after inpatient rehabilitation; comparison of the responsiveness of the Barthel Index and Functional [place] Measure. Journal of Neurology, Neurosurgery, and Psychiatry, 66(4), 935-769. HARLEY Ayoub.CESAR, BRIAN Moore, & Nanda Richard M.A. (2004.) Assessment of post-stroke quality of life in cost-effectiveness studies: The usefulness of the Barthel Index and the EuroQoL-5D. Quality of Life Research, 13, 068-84         G codes: In compliance with CMSs Claims Based Outcome Reporting, the following G-code set was chosen for this patient based on their primary functional limitation being treated: The outcome measure chosen to determine the severity of the functional limitation was the Barthel Index with a score of 70/100 which was correlated with the impairment scale. · Self Care:               - CURRENT STATUS:    CJ - 20%-39% impaired, limited or restricted               - GOAL STATUS:           CI - 1%-19% impaired, limited or restricted               - D/C STATUS:                       ---------------To be determined---------------      Occupational Therapy Evaluation Charge Determination   History Examination Decision-Making   LOW Complexity : Brief history review  LOW Complexity : 1-3 performance deficits relating to physical, cognitive , or psychosocial skils that result in activity limitations and / or participation restrictions  LOW Complexity : No comorbidities that affect functional and no verbal or physical assistance needed to complete eval tasks       Based on the above components, the patient evaluation is determined to be of the following complexity level: LOW   Pain:  Pain Scale 1: Numeric (0 - 10)  Pain Intensity 1: 0              Activity Tolerance:   Patient tolerated eval well. After treatment:   [ ] Patient left in no apparent distress sitting up in chair  [X] Patient left in no apparent distress in bed  [X] Call bell left within reach  [X] Nursing notified  [ ] Caregiver present  [X] Bed alarm activated      COMMUNICATION/EDUCATION:   The patients plan of care was discussed with: Physical Therapist, Registered Nurse and patient. .  [X] Home safety education was provided and the patient/caregiver indicated understanding. [X] Patient/family have participated as able in goal setting and plan of care.   [X] Patient/family agree to work toward stated goals and plan of care. [ ] Patient understands intent and goals of therapy, but is neutral about his/her participation. [ ] Patient is unable to participate in goal setting and plan of care. This patients plan of care is appropriate for delegation to KEILA.      Thank you for this referral.  Richa Reid, OTR/L  Time Calculation: 16 mins

## 2017-07-25 NOTE — PROGRESS NOTES
Problem: Dysphagia (Adult)  Goal: *Acute Goals and Plan of Care (Insert Text)  Swallowing goals initiated 7-25-17:  1) Tolerate regular diet, thin liquids without increased s/s aspiration by 7-28-17  SPEECH LANGUAGE PATHOLOGY BEDSIDE SWALLOW EVALUATION  Patient: Norma Krause (54 y.o. male)  Date: 7/25/2017  Primary Diagnosis: Dyspnea  CHF, acute (HCC)        Precautions: aspiration         ASSESSMENT :  Based on the objective data described below, the patient presents with mild dysphagia, that appears to be premorbid. He may have chronic aspiration, as pharygneal residue is suspected. he was receiving  SLP and should probably continue with this. .     Patient will benefit from skilled intervention to address the above impairments. Patients rehabilitation potential is considered to be Fair  Factors which may influence rehabilitation potential include:   [ ]            None noted  [X]            Mental ability/status  [X]            Medical condition  [ ]            Home/family situation and support systems  [ ]            Safety awareness  [ ]            Pain tolerance/management  [ ]            Other:        PLAN :  Recommendations and Planned Interventions:  Angelika Reardon for diet as tolerated, unless wife reports he has been on a different diet as home. Frequency/Duration: Patient will be followed by speech-language pathology 2 times a week to address goals. Discharge Recommendations: Home Health FOR SWALLOWING THERAPY       SUBJECTIVE:   Patient stated I had 2 sisters, so that's why I'm trouble. x4 in session      OBJECTIVE:       Past Medical History:   Diagnosis Date    CAD (coronary artery disease), native coronary artery      CKD (chronic kidney disease) stage 3, GFR 30-59 ml/min      Debilitated patient      Dementia      Dysphagia      Hearing loss      Hypercholesterolemia      Hypertension      Pulmonary hypertension (HCC)      Systolic CHF, chronic (HCC)       Past Surgical History:   Procedure Laterality Date    HX AORTIC VALVE REPLACEMENT        HX APPENDECTOMY        HX CORONARY STENT PLACEMENT         Prior Level of Function/Home Situation:   Home Situation  Home Environment: Independent living  One/Two Story Residence: Other (Comment)  Living Alone: No  Support Systems: Friends \ neighbors, Family member(s)  Patient Expects to be Discharged to[de-identified] Other (comment)  Current DME Used/Available at Home: Maryse Goldberg, rollator, Shower chair  Diet prior to admission: regular, thins  Current Diet:  Regular, thins   Cognitive and Communication Status:  Neurologic State: Alert  Orientation Level: Oriented to person, Disoriented to place, Disoriented to situation, Disoriented to time  Cognition: Memory loss, Impaired decision making  Perception:  (some Samish)  Perseveration: No perseveration noted  Safety/Judgement: Decreased insight into deficits  Oral Assessment:  Oral Assessment  Labial: No impairment  Dentition: Upper dentures;Limited  Oral Hygiene: WFL  Lingual: No impairment  Mandible: No impairment  P.O. Trials:  Patient Position: upright  in bed  Vocal quality prior to P.O.: No impairment  Consistency Presented: Thin liquid; Solid;Pill/Tablet (with RN)  How Presented: Self-fed/presented;Spoon;Straw;Cup/gulp; Successive swallows   ORAL PHASE:   Bolus Acceptance: No impairment  Bolus Formation/Control: No impairment     Propulsion: No impairment  Oral Residue: None   PHARYNGEAL PHASE:   Initiation of Swallow: No impairment  Laryngeal Elevation: Weak (mild-moderate)  Aspiration Signs/Symptoms: Clear throat; Change vocal quality (intermittantly during meal. suspect pharyngeal residue)      Noted in chart that patient has h/o dysphagia, this has not been evaluated at [place], so uncertain of the nature or severity of it. NOMS:   The NOMS functional outcome measure was used to quantify this patient's level of swallowing impairment.   Based on the NOMS, the patient was determined to be at level 5 for swallow function      G Codes: In compliance with CMSs Claims Based Outcome Reporting, the following G-code set was chosen for this patient based the use of the NOMS functional outcome to quantify this patient's level of swallowing impairment. Using the NOMS, the patient was determined to be at level 5 for swallow function which correlates with the CJ= 20-39% level of severity. Based on the objective assessment provided within this note, the current, goal, and discharge g-codes are as follows:     Swallow  Swallowing:   Swallow Current Status CJ= 20-39%   Swallow Goal Status CI= 1-19%         NOMS Swallowing Levels:  Level 1 (CN): NPO  Level 2 (CM): NPO but takes consistency in therapy  Level 3 (CL): Takes less than 50% of nutrition p.o. and continues with nonoral feedings; and/or safe with mod cues; and/or max diet restriction  Level 4 (CK): Safe swallow but needs mod cues; and/or mod diet restriction; and/or still requires some nonoral feeding/supplements  Level 5 (CJ): Safe swallow with min diet restriction; and/or needs min cues  Level 6 (CI): Independent with p.o.; rare cues; usually self cues; may need to avoid some foods or needs extra time  Level 7 ([address] [place] [address]): Independent for all p.o.  DONA. (2003). National Outcomes Measurement System (NOMS): Adult Speech-Language Pathology User's Guide. Pain:  Pain Scale 1: Numeric (0 - 10)  Pain Intensity 1: 0     After treatment:   [ ]            Patient left in no apparent distress sitting up in chair  [ ]            Patient left in no apparent distress in bed  [X]            Call bell left within reach  [X]            Nursing notified  [ ]            Caregiver present  [ ]            Bed alarm activated      COMMUNICATION/EDUCATION:   The patients plan of care including recommendations, planned interventions, and recommended diet changes were discussed with: Registered Nurse and Certified Nursing Assistant/Patient Care Technician. Patient was educated regarding His deficit(s) of dysphagia  as this relates to His diagnosis of dyspnea. He demonstrated Guarded understanding as evidenced by memory loss. [ ]            Posted safety precautions in patient's room. [ ]            Patient/family have participated as able in goal setting and plan of care. [ ]            Patient/family agree to work toward stated goals and plan of care. [ ]            Patient understands intent and goals of therapy, but is neutral about his/her participation. [X]            Patient is unable to participate in goal setting and plan of care. -due to memory loss     Thank you for this referral.  Sunitha Barrett, SLP  Time Calculation: 20 mins

## 2017-07-25 NOTE — PROGRESS NOTES
Nutrition Assessment:    RECOMMENDATIONS/INTERVENTION(S):   Continue Cardiac diet per SLP    RD to start ONS trial to aid in meeting nutrition needs  · Ensure Pudding BID  · Magic Cup once daily    Monitor plan of care, PO intake & tolerance, renal labs, lytes, wt  ASSESSMENT:   7/25:  MST referral received for wt loss, decreased appetite & difficulty chewing and/or swallowing PTA. 80 yom admitted for dyspnea, acute CHF. Pmhx includes dementia, HTN, CKD 3, CAD, dysphagia, hypercholesterolemia. SLP eval w/ recs to continue regular diet texture unless wife requests otherwise. Visited pt this afternoon. Sitter at bedside - reports good PO of breakfast meal w/ no s/s of intolerance. Unable to collect dietary hx 2/2 pt medical condition. Wife not present at this time. Normal BMI - low end per age. Noted 4.8% wt loss x 9 days. Labs/meds reviewed. Elevated BUN, Cr.  K requiring repletion. Na low. SUBJECTIVE/OBJECTIVE:     Diet Order: Cardiac, 1800 ml fluid restriction  % Eaten:  No data found. Pertinent Medications: [x] Reviewed - KCl, lasix, zocor, zofran    Chemistries:  Lab Results   Component Value Date/Time    Sodium 134 07/25/2017 12:49 AM    Potassium 3.4 07/25/2017 12:49 AM    Chloride 100 07/25/2017 12:49 AM    CO2 25 07/25/2017 12:49 AM    Anion gap 9 07/25/2017 12:49 AM    Glucose 106 07/25/2017 12:49 AM    BUN 32 07/25/2017 12:49 AM    Creatinine 2.32 07/25/2017 12:49 AM    BUN/Creatinine ratio 14 07/25/2017 12:49 AM    GFR est AA 32 07/25/2017 12:49 AM    GFR est non-AA 27 07/25/2017 12:49 AM    Calcium 7.7 07/25/2017 12:49 AM    AST (SGOT) 20 07/24/2017 10:08 AM    Alk.  phosphatase 65 07/24/2017 10:08 AM    Protein, total 6.8 07/24/2017 10:08 AM    Albumin 3.0 07/24/2017 10:08 AM    Globulin 3.8 07/24/2017 10:08 AM    A-G Ratio 0.8 07/24/2017 10:08 AM    ALT (SGPT) 28 07/24/2017 10:08 AM      Anthropometrics: Height: 5' 8\" (172.7 cm) Weight: 71.5 kg (157 lb 10.1 oz)    IBW (%IBW): 77 kg (169 lb 12.1 oz) (92.86 %) UBW (%UBW):   (  %)    BMI: Body mass index is 23.97 kg/(m^2). This BMI is indicative of:   [] Underweight    [x] Normal - low end per age    [] Overweight    []  Obesity    []  Extreme Obesity (BMI>40)  Estimated Nutrition Needs (Based on): 1773 Kcals/day (-2023 (RMR (1364) x 1.3 AF (+250))) , 71 g (-85 (1.0-1.2 g/kg x actual BW)) Protein  Carbohydrate: At Least 130 g/day  Fluids: 1800 mL/day    Last BM: 7/23  [x]Active     []Hyperactive  []Hypoactive       [] Absent   BS  Skin:    [] Intact   [] Incision  [] Breakdown   [] DTI   [] Tears/Excoriation/Abrasion  [x]Edema - 1+ RLE, trace LLE [] Other:      Wt Readings from Last 30 Encounters:   07/25/17 71.5 kg (157 lb 10.1 oz)   07/16/17 74.8 kg (165 lb)   05/01/17 68.5 kg (151 lb)   10/19/16 73.9 kg (163 lb)   06/07/16 73 kg (161 lb)   05/24/16 73.5 kg (162 lb)   10/07/15 72.6 kg (160 lb)   08/25/15 72.7 kg (160 lb 3.2 oz)   08/11/15 73 kg (161 lb)   07/28/15 73 kg (161 lb)   07/15/15 72.3 kg (159 lb 6.4 oz)      NUTRITION DIAGNOSES:   Problem:  Unintended weight loss     Etiology: related to conditions associated with a dx - acute CHF     Signs/Symptoms: as evidenced by reports of decreased appetite, wt loss PTA      NUTRITION INTERVENTIONS:  Meals/Snacks: General/healthful diet   Supplements: Commercial supplement              GOAL:   Tolerate/consume >/= 75% PO w/ stable lytes in the next 1-3 days    Cultural, Episcopal, or Ethnic Dietary Needs: None     LEARNING NEEDS (Diet, Food/Nutrient-Drug Interaction):    [x] None Identified   [] Identified and Education Provided/Documented   [] Identified and Pt declined/was not appropriate      [x] Interdisciplinary Care Plan Reviewed/Documented    [x] Discharge Needs:    TBD   [] No Nutrition Related Discharge Needs    NUTRITION RISK:   Pt Is At Nutrition Risk  [x]     No Nutrition Risk Identified  []       PT SEEN FOR:    []  MD Consult: []Calorie Count      []Diabetic Diet Education        []Diet Education     []Electrolyte Management     []General Nutrition Management and Supplements     []Management of Tube Feeding     []TPN Recommendations    [x]  RN Referral:  [x]MST score >=2     []Enteral/Parenteral Nutrition PTA     []Pregnant: Gestational DM or Multigestation                 [] Pressure Ulcer    []  Low BMI      []  Length of Stay       [] Dysphagia Diet         [] Ventilator  []  Follow-up     Previous Recommendations:   [] Implemented          [] Not Implemented          [x] Not Applicable    Previous Goal:   [] Met              [] Progressing Towards Goal              [] Not Progressing Towards Goal   [x] Not Applicable            Kat Nielson, [address] [place] [address]   Pager [phone]

## 2017-07-25 NOTE — PROGRESS NOTES
1604 Pt seen by PT,pt passed 6 min walk,on RA, needs 1 X assist to amb with walker. 1945 Wife in room, anxious. Family wants to take her home, because makes the pt anxious also. 0433 Pt go up several times to the bathroom with walker, pt doesn't call the bell nor asks sitter for help. Myriam Heard Pt jokes. Sitter in the room. Pt oxygen saturation over 90% with NC off during night. Bedside and Verbal shift change report given to Genna GOODMAN (oncoming nurse) by Oral Harden RN (offgoing nurse). Report included the following information SBAR, Kardex, MAR and Cardiac Rhythm sinus rhythm 1 degree AVB.

## 2017-07-26 VITALS
OXYGEN SATURATION: 94 % | TEMPERATURE: 97.5 F | DIASTOLIC BLOOD PRESSURE: 95 MMHG | SYSTOLIC BLOOD PRESSURE: 157 MMHG | HEART RATE: 71 BPM | RESPIRATION RATE: 18 BRPM | HEIGHT: 68 IN | WEIGHT: 157.63 LBS | BODY MASS INDEX: 23.89 KG/M2

## 2017-07-26 LAB
ANION GAP BLD CALC-SCNC: 8 MMOL/L (ref 5–15)
BUN SERPL-MCNC: 34 MG/DL (ref 6–20)
BUN/CREAT SERPL: 15 (ref 12–20)
CALCIUM SERPL-MCNC: 8.4 MG/DL (ref 8.5–10.1)
CHLORIDE SERPL-SCNC: 102 MMOL/L (ref 97–108)
CO2 SERPL-SCNC: 25 MMOL/L (ref 21–32)
CREAT SERPL-MCNC: 2.3 MG/DL (ref 0.7–1.3)
ERYTHROCYTE [DISTWIDTH] IN BLOOD BY AUTOMATED COUNT: 16.5 % (ref 11.5–14.5)
GLUCOSE SERPL-MCNC: 127 MG/DL (ref 65–100)
HCT VFR BLD AUTO: 26.9 % (ref 36.6–50.3)
HGB BLD-MCNC: 8.6 G/DL (ref 12.1–17)
MAGNESIUM SERPL-MCNC: 2 MG/DL (ref 1.6–2.4)
MCH RBC QN AUTO: 28.9 PG (ref 26–34)
MCHC RBC AUTO-ENTMCNC: 32 G/DL (ref 30–36.5)
MCV RBC AUTO: 90.3 FL (ref 80–99)
PLATELET # BLD AUTO: 126 K/UL (ref 150–400)
POTASSIUM SERPL-SCNC: 5.2 MMOL/L (ref 3.5–5.1)
RBC # BLD AUTO: 2.98 M/UL (ref 4.1–5.7)
SODIUM SERPL-SCNC: 135 MMOL/L (ref 136–145)
WBC # BLD AUTO: 5.5 K/UL (ref 4.1–11.1)

## 2017-07-26 PROCEDURE — 36415 COLL VENOUS BLD VENIPUNCTURE: CPT | Performed by: INTERNAL MEDICINE

## 2017-07-26 PROCEDURE — 74011250637 HC RX REV CODE- 250/637: Performed by: SPECIALIST

## 2017-07-26 PROCEDURE — 74011250637 HC RX REV CODE- 250/637: Performed by: INTERNAL MEDICINE

## 2017-07-26 PROCEDURE — 83735 ASSAY OF MAGNESIUM: CPT | Performed by: INTERNAL MEDICINE

## 2017-07-26 PROCEDURE — 74011250636 HC RX REV CODE- 250/636: Performed by: INTERNAL MEDICINE

## 2017-07-26 PROCEDURE — 85027 COMPLETE CBC AUTOMATED: CPT | Performed by: INTERNAL MEDICINE

## 2017-07-26 PROCEDURE — 80048 BASIC METABOLIC PNL TOTAL CA: CPT | Performed by: INTERNAL MEDICINE

## 2017-07-26 RX ORDER — FUROSEMIDE 20 MG/1
20 TABLET ORAL DAILY
Qty: 30 TAB | Refills: 0 | Status: SHIPPED | OUTPATIENT
Start: 2017-07-26 | End: 2017-08-25

## 2017-07-26 RX ORDER — CARVEDILOL 12.5 MG/1
25 TABLET ORAL 2 TIMES DAILY
Qty: 120 TAB | Refills: 0 | Status: SHIPPED
Start: 2017-07-26 | End: 2017-08-25

## 2017-07-26 RX ORDER — AMLODIPINE BESYLATE 5 MG/1
5 TABLET ORAL DAILY
Qty: 30 TAB | Refills: 0 | Status: SHIPPED | OUTPATIENT
Start: 2017-07-26 | End: 2017-08-25

## 2017-07-26 RX ADMIN — Medication 10 ML: at 04:39

## 2017-07-26 RX ADMIN — AMLODIPINE BESYLATE 5 MG: 5 TABLET ORAL at 09:04

## 2017-07-26 RX ADMIN — HEPARIN SODIUM 5000 UNITS: 5000 INJECTION, SOLUTION INTRAVENOUS; SUBCUTANEOUS at 01:00

## 2017-07-26 RX ADMIN — HEPARIN SODIUM 5000 UNITS: 5000 INJECTION, SOLUTION INTRAVENOUS; SUBCUTANEOUS at 09:04

## 2017-07-26 RX ADMIN — FUROSEMIDE 20 MG: 20 TABLET ORAL at 09:04

## 2017-07-26 RX ADMIN — CARVEDILOL 12.5 MG: 6.25 TABLET, FILM COATED ORAL at 09:04

## 2017-07-26 NOTE — PHYSICIAN ADVISORY
Letter of Status Determination: Current Status   INPATIENT is Appropriate         Pt Name:  Michael Muñiz   MR#  [id number]   [facility]#   [id number]   [address] [place] [address]  328/01  @ [facility]   Hospitalization date  7/24/2017  9:14 AM   Current Attending Physician  No att. providers found   Principal diagnosis  Dyspnea    Clinicals  80 y.o. y.o  male hospitalized with above diagnosis. The patient has complex medical issues including but not limited to CAD, CKD, HTN, Hyperlipidemia, CHF etc.   He was noted to have pulmonary edema, B/L Pleural effusion, and significant tachypnea (RR ~23/min)  on admission. He was treated with IV Diuretics and required close monitoring on tele bed. He developed Acute on Chronic renal failure He had about 0.25 ml/kg/hr urine output on the first 12 hours. His urine output again decreased to about above rate the next day.  Thus he DID require IV diuretics       [place] MCG criteria   Does   apply Heart Failure ORG: M-190 (ISC)  Decresed Urine output    STATUS DETERMINATION  On the basis of clinical data, available documentaion, we believe that the current status of this patient as INPATIENT is Appropriate    Additional comments     Insurance  Payor: Luis Hopkins / Plan: [address] [place] [address] HMO / Product Type: Managed Care Medicare /    Insurance Information                [address] HMO Phone:     Subscriber: Amina Perez Subscriber#: [id number]    Group#: [id number] Precert#:                  Brian Rosas MD MPH FACP     Physician Advisor    [address]     [facility], [address] [place] [address]    Cell  [phone]

## 2017-07-26 NOTE — DISCHARGE SUMMARY
Physician Discharge Summary     Patient ID:  Kelly Marquez  [id number]  80 y.o.  3/11/1930    Admit date: 7/24/2017    Discharge date and time: 7/26/2017    Admission Diagnoses:     Dyspnea    Discharge Diagnoses:    Principal Diagnosis     CHF, acute ([facility]                                             Other Diagnoses    Dyspnea (7/24/2017)    CAD (coronary artery disease), native coronary artery (7/15/2015)    H/O aortic aneurysm repair (7/15/2015)    CKD (chronic kidney disease) stage 3, GFR 30-59 ml/min ()    Debilitated patient ()    Dementia ()    Dysphagia ()    Hypercholesterolemia ()    Hypertension ()    Anemia (7/24/2017)    Hyponatremia (7/24/2017)    ARF (acute renal failure) ([facility] (7/24/2017)    CHF, acute ([facility] (7/24/2017)    Pulmonary hypertension (HCC) ()    Systolic CHF, chronic ([facility] ()    Hospital Course:   Diastolic CHF, chronic / Dyspnea - POA, not hypoxic, but had tachypnea and pulm edema on xray. EF 45% on ECHO. Consulted cards. Daily weight. Lasix PO. Continue coreg, but dose to 25.     CAD (coronary artery disease), native coronary artery / Hypertension - BP Elevated, likely due to anxiety, dyspnea, and CHF. Monitor with diuresis. Resume coreg, added norvasc. Troponin 0.06 likely strain alone.     CKD (chronic kidney disease) stage 3 / Hyponatremia - POA, likely multifactorial. I no longer think he had ARF from recent nausea anorexia, nor rhabdo from recent leg injury. May have poor flow from CHF. Stable with diuresis. Renal outpatient follow up.      Pulmonary hypertension / Elevated DDimer - Unclear etiology. PulmHTN moderate. DDimer may be from leg bruising, and VQ scan ruled out PE.     Anemia - Moderate, chronic disease. Normal serologies. No report of bleeding.     Debilitated patient / Dysphagia - Fall and aspiration precautions. PT/OT/Speech eval, now ready to go back to [facility] aortic aneurysm repair - ECHO without defect.     Dementia - Supportive care.   Followed by Dr Priscilla Lovell. Intolerant of meds.     Hypercholesterolemia - Per records, but LDL 41 on panel. May not warrant statin given worsening dementia      PCP: Max Villanueva MD    Consults: Cardiology    Significant Diagnostic Studies: See Hospital Course    Discharged home in improved condition. Discharge Exam:   BP (!) 157/99 (BP 1 Location: Right arm, BP Patient Position: Post activity)    Pulse 86    Temp 97.5 °F (36.4 °C)    Resp 19    Ht 5' 8\" (1.727 m)    Wt 71.5 kg (157 lb 10.1 oz)    SpO2 92%    BMI 23.97 kg/m2      Gen:  Well-developed, well-nourished, in no acute distress  HEENT:  Pink conjunctivae, PERRL, hearing intact to voice, moist mucous membranes  Neck:  Supple, without masses, thyroid non-tender  Resp:  No accessory muscle use, clear breath sounds without wheezes rales or rhonchi  Card:  No murmurs, normal S1, S2 without thrills, bruits, 1+ RLE peripheral edema  Abd:  Soft, non-tender, non-distended, normoactive bowel sounds are present, no mass  Lymph:  No cervical or inguinal adenopathy  Musc:  No cyanosis or clubbing  Skin:  No rashes or ulcers, skin turgor is good  Neuro:  Cranial nerves are grossly intact, general motor weakness, follows commands vaguely  Psych:  Poor insight, oriented to person, place    Patient Instructions:   Current Discharge Medication List      START taking these medications    Details   amLODIPine (NORVASC) 5 mg tablet Take 1 Tab by mouth daily for 30 days. Qty: 30 Tab, Refills: 0      furosemide (LASIX) 20 mg tablet Take 1 Tab by mouth daily for 30 days. Qty: 30 Tab, Refills: 0         CONTINUE these medications which have CHANGED    Details   carvedilol (COREG) 12.5 mg tablet Take 2 Tabs by mouth two (2) times a day for 30 days. Qty: 120 Tab, Refills: 0         CONTINUE these medications which have NOT CHANGED    Details   simvastatin (ZOCOR) 20 mg tablet Take 1 Tab by mouth nightly.   Qty: 90 Tab, Refills: 3    Associated Diagnoses: Other hyperlipidemia omega-3 fatty acids-vitamin e (FISH OIL) 1,000 mg cap Take 1 Cap by mouth daily. Activity: Activity as tolerated and PT/OT per Home Health  Diet: Cardiac Diet, Low fat, Low cholesterol and limit fluid intake  Wound Care: None needed    Follow-up with your PCP and cardiology  in a few weeks.   Follow-up tests/labs - none    Signed:  Merari Barr MD  7/26/2017  8:46 AM

## 2017-07-26 NOTE — PROGRESS NOTES
Discharge instructions, including information on new medications, were reviewed with patient and his wife and daughter. All questions were answered. IV site and heart monitor were removed. Patient received a copy of his discharge papers and 2 prescriptions and will be discharged home with his family. Case Management has made arrangement for home health through [facility].

## 2017-07-26 NOTE — PROGRESS NOTES
Home Care Face to Face Encounter    Patients Name: Irvin Jordan    YOB: 1930    Primary Diagnosis: CHF    Date of Face to Face:   July 26, 2017                                  Face to Face Encounter findings are related to primary reason for home care:   yes. 1. I certify that the patient needs intermittent care as follows: skilled nursing care:  teaching/training of CHF, skilled observation/assessment, patient education, complex care plan management and administration of medications  physical therapy: strengthening, stretching/ROM, transfer training, gait/stair training, balance training and pt/caregiver education  occupational therapy:  ADL safety (ie. cooking, bathing, dressing) and pt/caregiver education  speech therapy:  oral motor development, cognition training, swallowing education and pt/caregiver education    2. I certify that this patient is homebound, that is: 1) patient requires the use of a walker device, special transportation, or assistance of another to leave the home; or 2) patient's condition makes leaving the home medically contraindicated; and 3) patient has a normal inability to leave the home and leaving the home requires considerable and taxing effort. Patient may leave the home for infrequent and short duration for medical reasons, and occasional absences for non-medical reasons. Homebound status is due to the following functional limitations: Patient with strength deficits limiting the performance of all ADL's without caregiver assistance or the use of an assistive device. Patient with poor safety awareness and is at risk for falls without assistance of another person and the use of an assistive device. Patient with poor ambulation endurance limiting their safe ability to ascend/descend the required number of steps to leave the home. Patient with progressive psychiatric illness with patient refusing to leave the home.   Patient poses a risk to self and others due to dementia. 3. I certify that this patient is under my care and that I, or a nurse practitioner or  [id number], or clinical nurse specialist, or certified nurse midwife, working with me, had a Face-to-Face Encounter that meets the physician Face-to-Face Encounter requirements. The following are the clinical findings from the [address] [place] [address] Street encounter that support the need for skilled services and is a summary of the encounter: per PT note \"the patient presents with confusion, decreased safety awareness, moderate risk for falls, decreased bed mobility, transfers and functional ambulation following admission for dyspnea. Patient was received in bed with sitter present in the room. He was able to state he was in the hospital but could not state name of this facility, date or time of year. Patient was able to stand and ambulate with rolling walker and minimal assist, 2nd person for equipment for 200 feet. O2 was monitored and though patient was dyspneic at times, O2 sats stable ranging from 96-97%, HR 71-77. Patient does not need home O2 but needs cues for safety and assistance at all times for gait activity. Recommend HHPT if wife can assist him at home. \"    See discharge summary      Javy Knight MD  7/26/2017

## 2017-07-26 NOTE — CARDIO/PULMONARY
Cardiac Rehab: Following patient for CHF education. LVEF 45%. Cardiologist is Dr Bakari Lamas. See CR nurse note by RAYMOND Davila (7/24/17) for initial cardiac teaching session with wife & daughter present. Pt has hx dementia & required a sitter during this admission. Cardiac medications:    ACE/ARB - none (creat>2)  BB - carvedilol   Statin - simvastatin (see lipid panel, 7/24/17). ASA - none  Prior to admission meds also included: fish oil. New PO Cardiac Meds: amlodipine & Lasix. Met with Freddy Reyna, his wife and his daughter, prior to discharge from [facility]. Purpose of visit was to answer questions and reinforce previous CHF teaching. Wife reported she had taken HF education folder home. Reviewed echo results, explained EF, mild MR & PaHTN. Discussed new cardiac meds, with emphasis on purposes & potential side effects. Also reviewed Dr Barbara Evans discharge summary, at family's request. Family was particularly pleased that VQ scan was negative for PE. Aware of hx CKD & need to f/u with nephrologist. Pt has f/u appt scheduled with Dr Bakari Lamas on 8/8/17. Wife & daughter verbalized understanding. Pt will need ongoing reinforcement, due to cognitive impairment. All questions were answered.

## 2017-07-26 NOTE — PROGRESS NOTES
Emilio Griffin [facility] [address]  [address] [facility] [employer], [id number] [place] [address]  [phone]      Medical Progress Note      NAME: Michael Muñiz   :  3/11/1930  MRM:  [id number]    Date/Time: 2017  8:41 AM       Assessment and Plan:     Diastolic CHF, chronic / Dyspnea - POA, not hypoxic, but had tachypnea and pulm edema on xray. EF 45% on ECHO. Consulted cards. Daily weight. Lasix PO. Continue coreg, but dose to 25. CAD (coronary artery disease), native coronary artery / Hypertension - BP Elevated, likely due to anxiety, dyspnea, and CHF. Monitor with diuresis. Resume coreg, added norvasc. Troponin 0.06 likely strain alone. CKD (chronic kidney disease) stage 3 / Hyponatremia - POA, likely multifactorial. I no longer think he had ARF from recent nausea anorexia, nor rhabdo from recent leg injury. May have poor flow from CHF. Stable with diuresis. Renal outpatient follow up. Pulmonary hypertension / Elevated DDimer - Unclear etiology. PulmHTN moderate. DDimer may be from leg bruising, and VQ scan ruled out PE. Anemia - Moderate, chronic disease. Normal serologies. No report of bleeding. Debilitated patient / Dysphagia - Fall and aspiration precautions. PT/OT/Speech eval, now ready to go back to [facility]    Hx aortic aneurysm repair - ECHO without defect. Dementia - Supportive care. Followed by Dr Barbara Murrell. Intolerant of meds. Hypercholesterolemia - Per records, but LDL 41 on panel. May not warrant statin given worsening dementia        Subjective:     Chief Complaint:  Feels better, less dyspnea, leg better    ROS:  (bold if positive, if negative)      Tolerating some PT  Tolerating some Diet        Objective:     Last 24hrs VS reviewed since prior progress note.  Most recent are:    Visit Vitals    BP (!) 157/99 (BP 1 Location: Right arm, BP Patient Position: Post activity)    Pulse 86    Temp 97.5 °F (36.4 °C)    Resp 19    Ht 5' 8\" (1.727 m)    Wt 71.5 kg (157 lb 10.1 oz)    SpO2 92%    BMI 23.97 kg/m2     SpO2 Readings from Last 6 Encounters:   07/26/17 92%   07/16/17 91%   05/01/17 96%   10/19/16 98%   06/07/16 95%   10/07/15 98%    O2 Flow Rate (L/min): 0 l/min       Intake/Output Summary (Last 24 hours) at 07/26/17 0841  Last data filed at 07/26/17 0432   Gross per 24 hour   Intake              953 ml   Output             1025 ml   Net              -72 ml        Physical Exam:    Gen:  Well-developed, well-nourished, in no acute distress  HEENT:  Pink conjunctivae, PERRL, hearing intact to voice, moist mucous membranes  Neck:  Supple, without masses, thyroid non-tender  Resp:  No accessory muscle use, clear breath sounds without wheezes rales or rhonchi  Card:  No murmurs, normal S1, S2 without thrills, bruits, 1+ RLE peripheral edema  Abd:  Soft, non-tender, non-distended, normoactive bowel sounds are present, no mass  Lymph:  No cervical or inguinal adenopathy  Musc:  No cyanosis or clubbing  Skin:  No rashes or ulcers, skin turgor is good  Neuro:  Cranial nerves are grossly intact, general motor weakness, follows commands vaguely  Psych:  Poor insight, oriented to person, place    Telemetry reviewed:   normal sinus rhythm  __________________________________________________________________  Medications Reviewed: (see below)  Medications:     Current Facility-Administered Medications   Medication Dose Route Frequency    carvedilol (COREG) tablet 12.5 mg  12.5 mg Oral BID    simvastatin (ZOCOR) tablet 20 mg  20 mg Oral QHS    sodium chloride (NS) flush 5-10 mL  5-10 mL IntraVENous Q8H    sodium chloride (NS) flush 5-10 mL  5-10 mL IntraVENous PRN    naloxone (NARCAN) injection 0.4 mg  0.4 mg IntraVENous PRN    acetaminophen (TYLENOL) tablet 650 mg  650 mg Oral Q4H PRN    diphenhydrAMINE (BENADRYL) capsule 25 mg  25 mg Oral Q4H PRN    ondansetron (ZOFRAN) injection 4 mg  4 mg IntraVENous Q4H PRN    heparin (porcine) injection 5,000 Units  5,000 Units SubCUTAneous Q8H    furosemide (LASIX) tablet 20 mg  20 mg Oral DAILY    amLODIPine (NORVASC) tablet 5 mg  5 mg Oral DAILY        Lab Data Reviewed: (see below)  Lab Review:     Recent Labs      07/26/17 0049 07/25/17 0049 07/24/17   1008   WBC  5.5  5.2  7.0   HGB  8.6*  7.9*  8.7*   HCT  26.9*  24.1*  26.0*   PLT  126*  107*  122*     Recent Labs      07/26/17 0049 07/25/17 0049 07/24/17   1008   NA  135*  134*  133*   K  5.2*  3.4*  3.9   CL  102  100  99   CO2  25  25  23   GLU  127*  106*  108*   BUN  34*  32*  30*   CREA  2.30*  2.32*  2.28*   CA  8.4*  7.7*  8.0*   MG  2.0  1.8   --    PHOS   --   4.7   --    ALB   --    --   3.0*   TBILI   --    --   1.0   SGOT   --    --   20   ALT   --    --   28     No results found for: GLUCPOC  No results for input(s): PH, PCO2, PO2, HCO3, FIO2 in the last 72 hours. No results for input(s): INR in the last 72 hours. No lab exists for component: INREXT, INREXT  All Micro Results     None          I have reviewed notes of prior 24hr.     Other pertinent lab: none    Total time spent with patient: [address] [place] [address] discussed with: Patient, Care Manager, Nursing Staff, Consultant/Specialist and >50% of time spent in counseling and coordination of care    Discussed:  Care Plan and D/C Planning    Prophylaxis:  Hep SQ and H2B/PPI    Disposition:   PT, OT, RN           ___________________________________________________    Attending Physician: Sher Dao MD

## 2017-07-26 NOTE — PROGRESS NOTES
07/26/17 8:48 AM  Patient accepted by [facility]. AVS and discharge instructions sent to [facility] in [employer]. 07/26/17 8:01 AM  Consult for home health noted. CM spoke with patient and his wife Mariana Patel (via telephone), they have used [facility] in the past and would like to use this agency again. Referral sent in [employer].   Info for [facility] added to AVS.  PCP appointment scheduled with Dr. Juan Mcdonald Tuisaíasday 8/1 at 3PM; added to AVS.

## 2017-07-26 NOTE — PROGRESS NOTES
SPoke with patient's wife about his dysphagia. She stated he recently had a set of sessions of swallowing therapy with Jonas, SLP with [facility] . He was given exercises that improved his swallow strength and compensatory strategies for diet. F/u SLP was recommended.

## 2017-07-26 NOTE — DISCHARGE INSTRUCTIONS
Patient Discharge Instructions    Jaymie La / [id number] : 3/11/1930    Admitted 2017 Discharged: 2017     Primary Diagnoses  Problem List as of 2017  Date Reviewed: 2017          Codes Class Noted - Resolved   Pulmonary hypertension (HCC)   Systolic CHF, chronic (HCC)   * (Principal)Dyspnea   CKD (chronic kidney disease) stage 3, GFR 30-59 ml/min   Debilitated patient   Dementia   Dysphagia   Hypercholesterolemia   Hypertension   Anemia   Hyponatremia   ARF (acute renal failure) (HCC)   CHF, acute (HCC)   CAD (coronary artery disease), native coronary artery   H/O aortic aneurysm repair          Take Home Medications     · It is important that you take the medication exactly as they are prescribed. · Keep your medication in the bottles provided by the pharmacist and keep a list of the medication names, dosages, and times to be taken in your wallet. · Do not take other medications without consulting your doctor. What to do at Home    Recommended diet: Cardiac Diet, Low fat, Low cholesterol and limit fluid intake    Recommended activity: Activity as tolerated and PT/OT per Home Health    If you experience worse symptoms, please follow up with your PCP or cardiology. ** See Dr. Edenilson Ramirez on [address]   at 46 at [address] [facility] [phone]    Follow-up with your PCP and cardiology in a few weeks        Information obtained by :  I understand that if any problems occur once I am at home I am to contact my physician. I understand and acknowledge receipt of the instructions indicated above.                                                                                                                                            Physician's or R.N.'s Signature                                                                  Date/Time Patient or Representative Signature                                                          Date/Time     Heart Failure: Care Instructions  Your Care Instructions    Heart failure occurs when your heart does not pump as much blood as the body needs. Failure does not mean that the heart has stopped pumping but rather that it is not pumping as well as it should. Over time, this causes fluid buildup in your lungs and other parts of your body. Fluid buildup can cause shortness of breath, fatigue, swollen ankles, and other problems. By taking medicines regularly, reducing sodium (salt) in your diet, checking your weight every day, and making lifestyle changes, you can feel better and live longer. Follow-up care is a key part of your treatment and safety. Be sure to make and go to all appointments, and call your doctor if you are having problems. It's also a good idea to know your test results and keep a list of the medicines you take. How can you care for yourself at home? Medicines  · Be safe with medicines. Take your medicines exactly as prescribed. Call your doctor if you think you are having a problem with your medicine. · Do not take any vitamins, over-the-counter medicine, or herbal products without talking to your doctor first. Gaylan Dura not take ibuprofen (Advil or Motrin) and naproxen (Aleve) without talking to your doctor first. They could make your heart failure worse. · You may be taking some of the following medicine. ¨ Beta-blockers can slow heart rate, decrease blood pressure, and improve your condition. Taking a beta-blocker may lower your chance of needing to be hospitalized. ¨ Angiotensin-converting enzyme inhibitors (ACEIs) reduce the heart's workload, lower blood pressure, and reduce swelling. Taking an ACEI may lower your chance of needing to be hospitalized again. ¨ Angiotensin II receptor blockers (ARBs) work like ACEIs. Your doctor may prescribe them instead of ACEIs.   ¨ Diuretics, also called water pills, reduce swelling. ¨ Potassium supplements replace this important mineral, which is sometimes lost with diuretics. ¨ Aspirin and other blood thinners prevent blood clots, which can cause a stroke or heart attack. You will get more details on the specific medicines your doctor prescribes. Diet  · Your doctor may suggest that you limit sodium to 2,000 milligrams (mg) a day or less. That is less than 1 teaspoon of salt a day, including all the salt you eat in cooking or in packaged foods. People get most of their sodium from processed foods. Fast food and restaurant meals also tend to be very high in sodium. · Ask your doctor how much liquid you can drink each day. You may have to limit liquids. Weight  · Weigh yourself without clothing at the same time each day. Record your weight. Call your doctor if you have a sudden weight gain, such as more than 2 to 3 pounds in a day or 5 pounds in a week. (Your doctor may suggest a different range of weight gain.) A sudden weight gain may mean that your heart failure is getting worse. Activity level  · Start light exercise (if your doctor says it is okay). Even if you can only do a small amount, exercise will help you get stronger, have more energy, and manage your weight and your stress. Walking is an easy way to get exercise. Start out by walking a little more than you did before. Bit by bit, increase the amount you walk. · When you exercise, watch for signs that your heart is working too hard. You are pushing yourself too hard if you cannot talk while you are exercising. If you become short of breath or dizzy or have chest pain, stop, sit down, and rest.  · If you feel \"wiped out\" the day after you exercise, walk slower or for a shorter distance until you can work up to a better pace. · Get enough rest at night. Sleeping with 1 or 2 pillows under your upper body and head may help you breathe easier. Lifestyle changes  · Do not smoke.  Smoking can make a heart condition worse. If you need help quitting, talk to your doctor about stop-smoking programs and medicines. These can increase your chances of quitting for good. Quitting smoking may be the most important step you can take to protect your heart. · Limit alcohol to 2 drinks a day for men and 1 drink a day for women. Too much alcohol can cause health problems. · Avoid getting sick from colds and the flu. Get a pneumococcal vaccine shot. If you have had one before, ask your doctor whether you need another dose. Get a flu shot each year. If you must be around people with colds or the flu, wash your hands often. When should you call for help? Call 911 if you have symptoms of sudden heart failure such as:  · You have severe trouble breathing. · You cough up pink, foamy mucus. · You have a new irregular or rapid heartbeat. Call your doctor now or seek immediate medical care if:  · You have new or increased shortness of breath. · You are dizzy or lightheaded, or you feel like you may faint. · You have sudden weight gain, such as more than 2 to 3 pounds in a day or 5 pounds in a week. (Your doctor may suggest a different range of weight gain.)  · You have increased swelling in your legs, ankles, or feet. · You are suddenly so tired or weak that you cannot do your usual activities. Watch closely for changes in your health, and be sure to contact your doctor if:  · You develop new symptoms. Where can you learn more? Go to http://harsh-ralph.info/. Enter H241 in the search box to learn more about \"Heart Failure: Care Instructions. \"  Current as of: April 3, 2017  Content Version: 11.3  © 6410-5261 [employer]. Care instructions adapted under license by [employer] (which disclaims liability or warranty for this information).  If you have questions about a medical condition or this instruction, always ask your healthcare professional. Mike Verdugo disclaims any warranty or liability for your use of this information.

## 2017-07-26 NOTE — PROGRESS NOTES
Pharmacist Discharge Medication Reconciliation    Discharge Provider:  Dr Pleitez Listen       Discharge Medications:      Current Discharge Medication List        START taking these medications         Dose & Instructions Dispensing Information Comments   Morning Noon Evening Bedtime      amLODIPine 5 mg tablet   Commonly known as:  NORVASC       Your last dose was: Your next dose is:              Dose:  5 mg   Take 1 Tab by mouth daily for 30 days. Quantity:  30 Tab   Refills:  0                         furosemide 20 mg tablet   Commonly known as:  LASIX       Your last dose was: Your next dose is:              Dose:  20 mg   Take 1 Tab by mouth daily for 30 days. Quantity:  30 Tab   Refills:  0                               CONTINUE these medications which have CHANGED         Dose & Instructions Dispensing Information Comments   Morning Noon Evening Bedtime      carvedilol 12.5 mg tablet   Commonly known as:  COREG   What changed:    - how much to take  - how to take this       Your last dose was: Your next dose is:              Dose:  25 mg   Take 2 Tabs by mouth two (2) times a day for 30 days. Quantity:  120 Tab   Refills:  0                               CONTINUE these medications which have NOT CHANGED         Dose & Instructions Dispensing Information Comments   Morning Noon Evening Bedtime      FISH OIL 1,000 mg Cap   Generic drug:  omega-3 fatty acids-vitamin e       Your last dose was: Your next dose is:              Dose:  1 Cap   Take 1 Cap by mouth daily. Refills:  0                         simvastatin 20 mg tablet   Commonly known as:  ZOCOR       Your last dose was: Your next dose is:              Dose:  20 mg   Take 1 Tab by mouth nightly. Quantity:  90 Tab   Refills:  3                                  Where to Get Your Medications        Information on where to get these meds will be given to you by the nurse or doctor. !  Ask your nurse or doctor about these medications     amLODIPine 5 mg tablet    carvedilol 12.5 mg tablet    furosemide 20 mg tablet                The patient's chart, MAR, and AVS were reviewed by   Damon Zuniga.  Nabeel Salas [facility] 23: [phone]

## 2017-08-04 ENCOUNTER — PATIENT OUTREACH (OUTPATIENT)
Dept: CARDIOLOGY CLINIC | Age: 82
End: 2017-08-04

## 2017-08-04 NOTE — PROGRESS NOTES
Transition of Care Nurse Navigator Note:  7/24-7/26/17:  ED to Hospital admission at [facility]  sent by PCP s/p Fall. Spoke with Ms. Larry- my  is doing much better since being at home- with his dementia. The hospital was wonderful and happy with the care but she plans on doing everything she can so he does not have to return to the hospital.   They saw his PCP on Tuesday 8/1/17. His BP meds that had been increased are now back down to his previous meds- and he is doing well. They have made a cardiology appointment with Russ Bustos who the PCP suggested they see. Ms. Celeste Solano relays that they moved here from [facility] and really like Dr. Juan Mcdonald- he sees things from the eyes of an 80year old. She states that Home health is there- \"they are wonderful\" . Nursing and Therapist are there and have been very helpful. She relays that he had a Fall with symptoms thereafter that lead up to him coming to the hospital.  This is the first time they are aware that he has any decreased heart function and the pulmonary hypertension. PTA she had a person who allows her to go shopping and who is good with her  and his dementia. I introduced myself and my role as a [facility] NN- outside of cardiology that would be available to assist- she wrote down my name and number. She said she would get back in touch    [id number] Moderate Risk   Summary of patients top three medical problems:     Problem 1: Chronic HF      Problem 2: CKD III     Problem 3: Dementia-debilitation. Patient's challenges to self management identified: None- wife is primary caretaker and declined resources. Medication Management:  Wife administers and supervises. Advance Care Planning: on file. Follow up appointments:  PCP and upcoming cardiology- new referral done from PCP. Patient verbalized understanding of all information discussed.  Patient has this Nurse Navigators contact information for any further questions, concerns, or needs. A written copy of this care plan was not provided to the patient. Hospital Course:   Diastolic CHF, chronic / Dyspnea - POA, not hypoxic, but had tachypnea and pulm edema on xray. EF 45% on ECHO. Consulted cards. Daily weight. Lasix PO. Continue coreg, but dose to 25. CAD  / Hypertension - BP Elevated, likely due to anxiety, dyspnea, and CHF. Monitor with diuresis. Resume coreg, added norvasc. Troponin 0.06 likely strain alone. CKDIII / Hyponatremia - POA, likely multifactorial. I no longer think he had ARF from recent nausea anorexia, nor rhabdo from recent leg injury. May have poor flow from CHF. Stable with diuresis. Renal outpatient follow up. Pulmonary hypertension / Elevated DDimer - Unclear etiology. PulmHTN moderate. DDimer may be from leg bruising, and VQ scan ruled out PE. Anemia - Moderate, chronic disease. Normal serologies. No report of bleeding. Debilitated patient / Dysphagia - Fall and aspiration precautions. PT/OT/Speech eval, now ready to go back to [facility]      Hx aortic aneurysm repair - ECHO without defect. Dementia - Supportive care. Intolerant of meds. Hypercholesterolemia - Per records, but LDL 41 on panel. May not warrant statin given worsening dementia      after they saw Dr. Rosy Garcia on 8/11/17- Friday.

## 2017-08-22 ENCOUNTER — PATIENT OUTREACH (OUTPATIENT)
Dept: CARDIOLOGY CLINIC | Age: 82
End: 2017-08-22

## 2017-08-22 NOTE — PROGRESS NOTES
NN Note:  Follow up phone call to Ms. Larry- left  asking her to let me know how her  is doing and if she needed any assistance. Phone call to Dr. Hoa Danielson office- cardiologist- they did come in on 8/11.   9/25 is his next appointment.